# Patient Record
Sex: MALE | Race: AMERICAN INDIAN OR ALASKA NATIVE | HISPANIC OR LATINO | Employment: FULL TIME | URBAN - METROPOLITAN AREA
[De-identification: names, ages, dates, MRNs, and addresses within clinical notes are randomized per-mention and may not be internally consistent; named-entity substitution may affect disease eponyms.]

---

## 2021-12-13 ENCOUNTER — HOSPITAL ENCOUNTER (EMERGENCY)
Facility: HOSPITAL | Age: 61
Discharge: HOME | End: 2021-12-13
Admitting: PHYSICIAN ASSISTANT
Payer: OTHER MISCELLANEOUS

## 2021-12-13 VITALS
WEIGHT: 225 LBS | DIASTOLIC BLOOD PRESSURE: 84 MMHG | RESPIRATION RATE: 16 BRPM | SYSTOLIC BLOOD PRESSURE: 121 MMHG | OXYGEN SATURATION: 97 % | TEMPERATURE: 97.6 F | HEIGHT: 68 IN | HEART RATE: 98 BPM | BODY MASS INDEX: 34.1 KG/M2

## 2021-12-13 DIAGNOSIS — S46.912A LEFT SHOULDER STRAIN, INITIAL ENCOUNTER: ICD-10-CM

## 2021-12-13 PROCEDURE — 99282 EMERGENCY DEPT VISIT SF MDM: CPT

## 2021-12-13 ASSESSMENT — ENCOUNTER SYMPTOMS
VOMITING: 0
BACK PAIN: 0
NEUROLOGICAL NEGATIVE: 1
ABDOMINAL PAIN: 0
NAUSEA: 0
FATIGUE: 0
SHORTNESS OF BREATH: 0
COUGH: 0

## 2021-12-13 ASSESSMENT — MIFFLIN-ST. JEOR: SCORE: 1800.09

## 2021-12-13 NOTE — DISCHARGE INSTRUCTIONS
Please wear the sling to help immobilize your shoulder.  Throughout the day please take it out of the sling to work on simple range of motion exercises.  I recommend ice and ibuprofen to be used as needed.    I would like you to follow-up through orthopedics as an outpatient where they will likely assess you and consider and likely order an MRI of the shoulder.  I suspect that you may be suffering from a rotator cuff injury or strain.  I will provide you a work note for the next week if there is longer time that needs to be extended orthopedics can write for additional time.

## 2021-12-13 NOTE — ED PROVIDER NOTES
EMERGENCY DEPARTMENT ENCOUNTER      NAME: Scot Spaulding  AGE: 61 year old male  YOB: 1960  MRN: [id number]  EVALUATION DATE & TIME: No admission date for patient encounter.    PCP: No primary care provider on file.    ED PROVIDER: Abraham Arciniega PA-C      Chief Complaint   Patient presents with     Shoulder Pain         FINAL IMPRESSION:  1. Left shoulder strain, initial encounter          MEDICAL DECISION MAKING:    Pertinent Labs & Imaging studies reviewed. (See chart for details)  61 year old male presents to the Emergency Department for evaluation of left shoulder pain x3 to 4 days.    After obtaining history present illness, reviewing vitals and examined the patient I believe that this is all musculoskeletal, likely rotator cuff strain.  I informed the patient that x-ray imaging will likely not be helpful but I would refer the patient for outpatient follow-up through orthopedics where they will likely assess the patient and from there they can order outpatient MRIs of the shoulder or neck.  Again I am most suspicious that this is rotator cuff in origin.  I will provide a work note to excuse him for the next week.  We will provide sling and recommend conservative therapy in the form of anti-inflammatories and ice.  Overall patient comfortable with plan of care.        ED COURSE    I met with the patient, obtained history, performed an initial exam, and discussed options and plan for diagnostics and treatment here in the ED.    At the conclusion of the encounter I discussed the results of all of the tests and the disposition. The questions were answered. The patient or family acknowledged understanding and was agreeable with the care plan.     MEDICATIONS GIVEN IN THE EMERGENCY:  Medications - No data to display    NEW PRESCRIPTIONS STARTED AT TODAY'S ER VISIT  New Prescriptions    No medications on file            =================================================================    HPI    Patient  information was obtained from: Patient        Scot Spaulding is a 61 year old male for evaluation of left shoulder pain.  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  After this extensive physical work day he did have significant left shoulder pain.  He localizes to the left shoulder with radiation up towards the neck and then down into the triceps area.  Nothing that radiates all the way down to the fingertips.  He denies any weakness but does have significant pain with minimal movement of the shoulder.  No previous history of surgeries to the shoulder.  He denies any shortness of breath.  Patient reports that he did take a muscle relaxant on Saturday night and was able to sleep, things seemed a little bit better but then the following morning it was stiff and quite tender still.  He has been using ibuprofen as well, no reports of ice.  Patient denies any nausea, vomiting, or abdominal pain, and again no reports of chest pain or shortness of breath.      REVIEW OF SYSTEMS   Review of Systems   Constitutional: Negative for fatigue.   Respiratory: Negative for cough and shortness of breath.    Cardiovascular: Negative for chest pain.   Gastrointestinal: Negative for abdominal pain, nausea and vomiting.   Musculoskeletal: Negative for back pain.        Positive for left shoulder joint pain   Skin: Negative.    Neurological: Negative.    All other systems reviewed and are negative.         PAST MEDICAL HISTORY:  No past medical history on file.    PAST SURGICAL HISTORY:  No past surgical history on file.      CURRENT MEDICATIONS:    No current facility-administered medications for this encounter.  No current outpatient medications on file.      ALLERGIES:  No Known Allergies    FAMILY HISTORY:  No family history on file.    SOCIAL HISTORY:   Social History     Socioeconomic History     Marital  "status: Not on file     Spouse name: Not on file     Number of children: Not on file     Years of education: Not on file     Highest education level: Not on file   Occupational History     Not on file   Tobacco Use     Smoking status: Not on file     Smokeless tobacco: Not on file   Substance and Sexual Activity     Alcohol use: Not on file     Drug use: Not on file     Sexual activity: Not on file   Other Topics Concern     Not on file   Social History Narrative     Not on file     Social Determinants of Health     Financial Resource Strain: Not on file   Food Insecurity: Not on file   Transportation Needs: Not on file   Physical Activity: Not on file   Stress: Not on file   Social Connections: Not on file   Intimate Partner Violence: Not on file   Housing Stability: Not on file       VITALS:  Patient Vitals for the past 24 hrs:   BP Temp Temp src Pulse Resp SpO2 Height Weight   12/13/21 0955 121/84 97.6  F (36.4  C) Oral 98 16 97 % 1.727 m (5' 8\") 102.1 kg (225 lb)       PHYSICAL EXAM    Physical Exam  Vitals and nursing note reviewed.   Constitutional:       General: He is not in acute distress.     Appearance: He is normal weight. He is not ill-appearing or toxic-appearing.   HENT:      Head: Normocephalic.      Right Ear: External ear normal.      Left Ear: External ear normal.      Nose: Nose normal.   Pulmonary:      Effort: Pulmonary effort is normal. No respiratory distress.   Musculoskeletal:      Comments: Patient has normal distal radial and ulnar pulses palpated in the left upper extremity.  Motor and sensory function in the left fingertips are intact, normal capillary refill.  Internal and external rotation of the left shoulder does cause severe pain even against minimal resistance.  Patient is able to ABduct to approximately 60 degrees, however this causes significant discomfort.  Patient cannot reach his hand up over his head.  There is no palpable deformity.  There is some mild reproducible " tenderness to touch of the left trapezius, axial loading of the cervical spine does not seem to recreate any of the symptoms.  And there is no reports of numbness and tingling affecting any fingertips.  Remainder of musculoskeletal examination is benign.   Skin:     General: Skin is warm and dry.   Neurological:      General: No focal deficit present.      Mental Status: He is alert. Mental status is at baseline.      Motor: No weakness.   Psychiatric:         Mood and Affect: Mood normal.         Behavior: Behavior normal.          LAB:  All pertinent labs reviewed and interpreted.       RADIOLOGY:  Reviewed all pertinent imaging. Please see official radiology report.  No orders to display         Abraham Arciniega PA-C  Emergency Medicine  [facility]     Abraham Arciniega PA-C  12/13/21 1036

## 2021-12-13 NOTE — Clinical Note
Scot Spaulding was seen and treated in our emergency department on 12/13/2021.  He may return to work on 12/15/2021.  Must be on light duty, wearing his sling involving his left shoulder over the next week.  No lifting anything over 10 pounds, no pushing or pulling anything over 10 pounds.  Further recommendations can come from outpatient follow-up through orthopedics     If you have any questions or concerns, please don't hesitate to call.      Abraham Arciniega PA-C

## 2021-12-13 NOTE — ED TRIAGE NOTES
Pt was shoveling snow at work on Saturday,  when he felt a pain in his left shoulder. This pain persists, unable to lift his arm without difficulty.

## 2021-12-13 NOTE — Clinical Note
Scot Spaulding was seen and treated in our emergency department on 12/13/2021.  He may return to work on 12/21/2021.       If you have any questions or concerns, please don't hesitate to call.      Abraham Arciniega PA-C

## 2021-12-15 ENCOUNTER — ANCILLARY PROCEDURE (OUTPATIENT)
Dept: GENERAL RADIOLOGY | Facility: CLINIC | Age: 61
End: 2021-12-15
Attending: PEDIATRICS
Payer: COMMERCIAL

## 2021-12-15 ENCOUNTER — OFFICE VISIT (OUTPATIENT)
Dept: ORTHOPEDICS | Facility: CLINIC | Age: 61
End: 2021-12-15
Payer: OTHER MISCELLANEOUS

## 2021-12-15 VITALS — HEART RATE: 88 BPM | WEIGHT: 225 LBS | HEIGHT: 69 IN | BODY MASS INDEX: 33.33 KG/M2 | RESPIRATION RATE: 16 BRPM

## 2021-12-15 DIAGNOSIS — M25.512 ACUTE PAIN OF LEFT SHOULDER: Primary | ICD-10-CM

## 2021-12-15 DIAGNOSIS — M25.512 ACUTE PAIN OF LEFT SHOULDER: ICD-10-CM

## 2021-12-15 DIAGNOSIS — S46.912A LEFT SHOULDER STRAIN, INITIAL ENCOUNTER: ICD-10-CM

## 2021-12-15 PROCEDURE — 73030 X-RAY EXAM OF SHOULDER: CPT | Mod: LT | Performed by: RADIOLOGY

## 2021-12-15 PROCEDURE — 99204 OFFICE O/P NEW MOD 45 MIN: CPT | Performed by: PEDIATRICS

## 2021-12-15 RX ORDER — ATORVASTATIN CALCIUM 20 MG/1
TABLET, FILM COATED ORAL
COMMUNITY
Start: 2021-08-24

## 2021-12-15 RX ORDER — GABAPENTIN 100 MG/1
CAPSULE ORAL
COMMUNITY
Start: 2021-08-25

## 2021-12-15 RX ORDER — FLASH GLUCOSE SENSOR
KIT MISCELLANEOUS
COMMUNITY
Start: 2021-10-27

## 2021-12-15 RX ORDER — INSULIN GLARGINE 100 [IU]/ML
INJECTION, SOLUTION SUBCUTANEOUS
COMMUNITY
Start: 2021-10-07

## 2021-12-15 ASSESSMENT — MIFFLIN-ST. JEOR: SCORE: 1815.97

## 2021-12-15 NOTE — PROGRESS NOTES
ASSESSMENT & PLAN    Scot was seen today for pain.    Diagnoses and all orders for this visit:    Acute pain of left shoulder  -     XR Shoulder Left G/E 3 Views; Future    Left shoulder strain, initial encounter  -     Orthopedic  Referral      Acute new problem, uncertain prognosis.  Concern for cuff source, tear is consideration.  We discussed the following: symptom treatment, activity modification/rest, imaging, rehab and support for the affected area. Following discussion, plan:  He prefers monitoring, support.   Continue with sling.  Workability letter, light duty.  Symptom management and activity modification reviewed.  We discussed consideration of additional imaging of the affected area with MRI, but will defer for now.  Recheck 7-10 days, sooner prn. Pending course considerations may be PT, imaging.  Questions answered. Discussed signs and symptoms that may indicate more serious issues; the patient was instructed to seek appropriate care if noted. Scot indicates understanding of these issues and agrees with the plan.        See Patient Instructions  Patient Instructions   Continue with sling for comfort.  We discussed icing, over-the-counter medication as needed.  May use acetaminophen (Tylenol), as well as ibuprofen (examples are Motrin, Advil) as needed.  Also okay to use topical treatment such as IcyHot.  Limit activities for pain.  Provided a letter regarding activities for work.  Anticipate recheck in approximately 7 to 10 days.  At that time, we will reassess the function at the shoulder, and discussed additional considerations; this may include getting an MRI.    If you have any further questions for your physician or physician s care team you can call [phone] and use option 3 to leave a voice message. Calls received during business hours will be returned same day.        Jamel Cantu [facility]      CC: bAraham BEATTY  "Suze      -----  Chief Complaint   Patient presents with     Left Shoulder - Pain       SUBJECTIVE  Scot Spaulding is a/an 61 year old male who is seen in consultation at the request of  Abraham Arciniega PA-C for evaluation of left shoulder. Pain began after shoveling on 12/11/21.  Pain stretches across the top of his shoulder and into his arm.  Denies any numbness or tingling.  No pop, but the pain was sudden.     Was seen in the ER.    This is a w/c injury     The patient is seen by themselves.  The patient is Right handed    Onset: 3 day(s) ago. Patient describes injury as shoveling   Location of Pain: left shoulder   Worsened by: use, movement of his arm/shoulder   Better with: nothing   Treatments tried: ibuprofen and topical analgesics   Associated symptoms: no distal numbness or tingling; denies swelling or warmth    Orthopedic/Surgical history: NO  Social History/Occupation: management of storage facility     No family history pertinent to patient's problem today.   **  Was acute onset with one lift of the shovel noted pain diffusely in left lateral neck, through shoulder, into upper arm.    ED note reviewed.      REVIEW OF SYSTEMS:  Review of Systems   All other systems reviewed and are negative.        OBJECTIVE:  Pulse 88   Resp 16   Ht 1.753 m (5' 9\")   Wt 102.1 kg (225 lb)   BMI 33.23 kg/m     General: healthy, alert and in no distress  HEENT: no scleral icterus or conjunctival erythema  Skin: no suspicious lesions or rash. No jaundice.  CV: distal perfusion intact   Resp: normal respiratory effort without conversational dyspnea   Psych: normal mood and affect  Gait: normal steady gait with appropriate coordination and balance   Neuro: Normal light sensory exam of  extremity       Cervical Spine Exam    Range of Motion:       forward flexion min change in pain         extension min change in pain        lateral rotation to left no change; to right with pain on left        lateral flexion to left " no change; to right with pain on left    Sensation:     grossly intact througout bilateral upper extremities    Special Tests:     Neck area pain with Spurling, no radicular symptoms    Skin:     well perfused       capillary refill brisk    Lymphatics:      no edema noted in the upper extremities             Left Shoulder exam    ROM:      forward flexion ~80        abduction ~30       internal rotation unable to do belly press position       external rotation 5-10 deg  Pain limits AROM above    Tender:      acromioclavicular joint       subacromial space       posterior shoulder       Upper trap    Strength:      abduction 3/5       internal rotation 4/5       external rotation 3/5    Impingement testing:       Millan        empty can  Attempts with pain, limited    Skin:      no visible deformities       well perfused       capillary refill brisk    Sensation:      normal sensation over shoulder and upper extremity         RADIOLOGY:  I independently ordered, visualized and reviewed these images with the patient  No acute bony abnormality. AC joint arthrosis present.    Recent Results (from the past 24 hour(s))   XR Shoulder Left G/E 3 Views    Narrative    SHOULDER LEFT THREE OR MORE VIEWS   12/15/2021 11:19 AM     HISTORY: Acute pain of left shoulder.    COMPARISON: None.      Impression    IMPRESSION: Moderate glenohumeral degenerative changes. Glenohumeral  joint is unremarkable. No fracture or dislocation.

## 2021-12-15 NOTE — LETTER
12/15/2021         RE: Scot Spaulding  [address]  [place] [address]        Dear Colleague,    Thank you for referring your patient, Scot Spaulding, to the [facility]. Please see a copy of my visit note below.    ASSESSMENT & PLAN    Scot was seen today for pain.    Diagnoses and all orders for this visit:    Acute pain of left shoulder  -     XR Shoulder Left G/E 3 Views; Future    Left shoulder strain, initial encounter  -     Orthopedic  Referral      Acute new problem, uncertain prognosis.  Concern for cuff source, tear is consideration.  We discussed the following: symptom treatment, activity modification/rest, imaging, rehab and support for the affected area. Following discussion, plan:  He prefers monitoring, support.   Continue with sling.  Workability letter, light duty.  Symptom management and activity modification reviewed.  We discussed consideration of additional imaging of the affected area with MRI, but will defer for now.  Recheck 7-10 days, sooner prn. Pending course considerations may be PT, imaging.  Questions answered. Discussed signs and symptoms that may indicate more serious issues; the patient was instructed to seek appropriate care if noted. Scot indicates understanding of these issues and agrees with the plan.        See Patient Instructions  Patient Instructions   Continue with sling for comfort.  We discussed icing, over-the-counter medication as needed.  May use acetaminophen (Tylenol), as well as ibuprofen (examples are Motrin, Advil) as needed.  Also okay to use topical treatment such as IcyHot.  Limit activities for pain.  Provided a letter regarding activities for work.  Anticipate recheck in approximately 7 to 10 days.  At that time, we will reassess the function at the shoulder, and discussed additional considerations; this may include getting an MRI.    If you have any further questions for your physician or physician s  "care team you can call [phone] and use option 3 to leave a voice message. Calls received during business hours will be returned same day.        Jamel Tobar [facility]      CC: Abraham Arciniega      -----  Chief Complaint   Patient presents with     Left Shoulder - Pain       SUBJECTIVE  Scot Spaulding is a/an 61 year old male who is seen in consultation at the request of  Abraham Arciniega PA-C for evaluation of left shoulder. Pain began after shoveling on 12/11/21.  Pain stretches across the top of his shoulder and into his arm.  Denies any numbness or tingling.  No pop, but the pain was sudden.     Was seen in the ER.    This is a w/c injury     The patient is seen by themselves.  The patient is Right handed    Onset: 3 day(s) ago. Patient describes injury as shoveling   Location of Pain: left shoulder   Worsened by: use, movement of his arm/shoulder   Better with: nothing   Treatments tried: ibuprofen and topical analgesics   Associated symptoms: no distal numbness or tingling; denies swelling or warmth    Orthopedic/Surgical history: NO  Social History/Occupation: management of storage facility     No family history pertinent to patient's problem today.   **  Was acute onset with one lift of the shovel noted pain diffusely in left lateral neck, through shoulder, into upper arm.    ED note reviewed.      REVIEW OF SYSTEMS:  Review of Systems   All other systems reviewed and are negative.        OBJECTIVE:  Pulse 88   Resp 16   Ht 1.753 m (5' 9\")   Wt 102.1 kg (225 lb)   BMI 33.23 kg/m     General: healthy, alert and in no distress  HEENT: no scleral icterus or conjunctival erythema  Skin: no suspicious lesions or rash. No jaundice.  CV: distal perfusion intact   Resp: normal respiratory effort without conversational dyspnea   Psych: normal mood and affect  Gait: normal steady gait with appropriate coordination and balance   Neuro: Normal light sensory " exam of  extremity       Cervical Spine Exam    Range of Motion:       forward flexion min change in pain         extension min change in pain        lateral rotation to left no change; to right with pain on left        lateral flexion to left no change; to right with pain on left    Sensation:     grossly intact througout bilateral upper extremities    Special Tests:     Neck area pain with Spurling, no radicular symptoms    Skin:     well perfused       capillary refill brisk    Lymphatics:      no edema noted in the upper extremities             Left Shoulder exam    ROM:      forward flexion ~80        abduction ~30       internal rotation unable to do belly press position       external rotation 5-10 deg  Pain limits AROM above    Tender:      acromioclavicular joint       subacromial space       posterior shoulder       Upper trap    Strength:      abduction 3/5       internal rotation 4/5       external rotation 3/5    Impingement testing:       Millan        empty can  Attempts with pain, limited    Skin:      no visible deformities       well perfused       capillary refill brisk    Sensation:      normal sensation over shoulder and upper extremity         RADIOLOGY:  I independently ordered, visualized and reviewed these images with the patient  No acute bony abnormality. AC joint arthrosis present.    Recent Results (from the past 24 hour(s))   XR Shoulder Left G/E 3 Views    Narrative    SHOULDER LEFT THREE OR MORE VIEWS   12/15/2021 11:19 AM     HISTORY: Acute pain of left shoulder.    COMPARISON: None.      Impression    IMPRESSION: Moderate glenohumeral degenerative changes. Glenohumeral  joint is unremarkable. No fracture or dislocation.               Again, thank you for allowing me to participate in the care of your patient.        Sincerely,        Jamel Cantu DO

## 2021-12-15 NOTE — PATIENT INSTRUCTIONS
Continue with sling for comfort.  We discussed icing, over-the-counter medication as needed.  May use acetaminophen (Tylenol), as well as ibuprofen (examples are Motrin, Advil) as needed.  Also okay to use topical treatment such as IcyHot.  Limit activities for pain.  Provided a letter regarding activities for work.  Anticipate recheck in approximately 7 to 10 days.  At that time, we will reassess the function at the shoulder, and discussed additional considerations; this may include getting an MRI.    If you have any further questions for your physician or physician s care team you can call [phone] and use option 3 to leave a voice message. Calls received during business hours will be returned same day.

## 2021-12-15 NOTE — LETTER
REPORT OF WORK ABILITY    NOTE TO EMPLOYEE: You must promptly provide a copy of this report to your  employer or worker's compensation insurer, and Qualified Rehabilitation Consultant.    Date: 12/15/2021                     Employee Name: Scot Spaulding         YOB: 1960  Medical Record Number: [id number]   Soc.Sec.No: [id number]  Employer: None                Date of Injury: 12/11/21  Managed Care Organization / Insurance Company Name: UNKNOWN    Diagnosis: left shoulder injury, strain  Work Related: yes     MMI: NO  Permanent Partial Disability (PPD) likely: UNKNOWN    EMPLOYEE IS ABLE TO WORK: Return to work with restrictions on next scheduled work date. Restrictions in place through recheck appointment, and will be updated at that time. Anticipate recheck in approximately 7-10 days.     RESTRICTIONS IF ANY:    Lift, carry:  Up to 5 lbs on left  Push/Pull:  Up to 5 lbs on left  Shoulder/Elbow:  left Avoid gripping/grasping, no outstretched arm, Avoid repetitive motion and No operating machine/vibrating tools  May use left hand/arm as a helper for the right side if comfortable.  Ladder/Stair climb:  Not at all (0 hours) for ladders  Reach Above Shoulders:  Not at all (0 hours) on left    OTHER RESTRICTIONS: None    TREATMENT PLAN/NOTES: change work position for comfort, best work height mid chest to mid thigh, may need to restrict work activities for comfort, continue medications as discussed, shoulder sling for comfort and cold pack for comfort and Rest as needed.          Jamel ROLDAN

## 2021-12-17 NOTE — PROGRESS NOTES
Sports Medicine Clinic Visit      Assessment:  1. Strain of left shoulder, subsequent encounter        Plan:  Discussed the assessment with the patient.  MRI next. Continue with current cares in the meantime.  Questions answered. Discussed signs and symptoms that may indicate more serious issues; the patient was instructed to seek appropriate care if noted. Scot indicates understanding of these issues and agrees with the plan.        See Patient Instructions  Patient Instructions   Continued concern for rotator cuff injury.  Plan MRI of the left shoulder next. Will plan to contact you with MRI results.  In the meantime, may continue with sling for comfort.  Icing, over-the-counter medication as needed for soreness.  We discussed that you could cautiously try ibuprofen instead of the acetaminophen (Tylenol) given the current stomach upset from medication, though note that anti-inflammatory medication such as ibuprofen can cause the same thing.  We discussed additional pain medication for this.  In particular, judicious use of opioid pain medication could be considered.  One-time prescription for oxycodone today, use sparingly.  Use over-the-counter medication first line.  Continue with current work restrictions.  Letter updated today.    Advanced imaging is done by appointment. Some insurance companies may require a prior authorization to be completed which can delay the time until you are able to schedule your appointment.   Please call [place]: [phone] to schedule your MRI.  Depending on your availability you can usually schedule within the next 1-2 days.  If you are active on [employer], you may have access to your test results before your provider is able to review the study and advise on next steps.        The clinic will call you with results. If you have not heard from the clinic within 2-3 days following your MRI, please contact us at the number listed below.    If you have any  further questions for your physician or physician s care team you can call [phone] and use option 3 to leave a voice message. Calls received during business hours will be returned same day.          DO PASQUALE Conway [facility] SPORTS MEDICINE CLINIC TIFF      ==========================================      PCP: Tj Juan    Scot Spaulding is a 61 year old male who is seen in f/u up for a left shoulder injury. Since last visit on 12/15/2021 patient has noticed superolateral shoulder pain and lateral shoulder pain increasing. He is wearing a sling during the day. He notes that every time he moves while trying to sleep, he wakes up due to pain. He was taking Tylenol but it upsets his stomach. Is currently  using IcyHot 2-3x/day. He is currently on light duty at work.   **  Feels worse compared to last visit.  Has only been using acetaminophen since last visit.    **  PDMP reviewed. No concerns; no current opioid medication or sedative.      Review of Systems  All other systems reviewed and are negative unless noted above.    Past Medical History:   Diagnosis Date     Diabetes (H)      No past surgical history on file.  No family history on file.  Social History     Socioeconomic History     Marital status: Single     Spouse name: Not on file     Number of children: Not on file     Years of education: Not on file     Highest education level: Not on file   Occupational History     Not on file   Tobacco Use     Smoking status: Never Smoker     Smokeless tobacco: Never Used   Substance and Sexual Activity     Alcohol use: Not on file     Drug use: Not on file     Sexual activity: Not on file   Other Topics Concern     Not on file   Social History Narrative     Not on file     Social Determinants of Health     Financial Resource Strain: Not on file   Food Insecurity: Not on file   Transportation Needs: Not on file   Physical Activity: Not on file   Stress: Not on file   Social Connections: Not on  "file   Intimate Partner Violence: Not on file   Housing Stability: Not on file         Objective  /80   Ht 1.727 m (5' 8\")   Wt 102.1 kg (225 lb)   BMI 34.21 kg/m      GENERAL APPEARANCE: healthy, alert and no distress   GAIT: NORMAL  SKIN: no suspicious lesions or rashes  NEURO: Normal strength and tone, mentation intact and speech normal  PSYCH:  mentation appears normal and affect normal/bright    HEENT: no scleral icterus  CV: distal perfusion intact  RESP: nonlabored breathing      Exam      Left shoulder:  Flexion 80-90 deg  Abduction 30-40 deg  ER: 10-15 deg  IR: able to do belly press, mildly limited, some pain present  AROM above, limited by pain           Radiology  Previous x-ray:    SHOULDER LEFT THREE OR MORE VIEWS   12/15/2021 11:19 AM      HISTORY: Acute pain of left shoulder.     COMPARISON: None.                                                                      IMPRESSION: Moderate glenohumeral degenerative changes. Glenohumeral  joint is unremarkable. No fracture or dislocation.     KYRIE ROGERS MD                    This note consists of symbols derived from keyboarding, dictation and/or voice recognition software. As a result, there may be errors in the script that have gone undetected. Please consider this when interpreting information found in this chart.      "

## 2021-12-21 ENCOUNTER — TELEPHONE (OUTPATIENT)
Dept: ORTHOPEDICS | Facility: CLINIC | Age: 61
End: 2021-12-21
Payer: COMMERCIAL

## 2021-12-21 NOTE — TELEPHONE ENCOUNTER
Scot is calling the clinic in regards to his shldr pain.  The last two days his shldr pain has increased quite a bit and wondering if he could get pain meds until his next appt.  Or perhaps if he should be seen earlier?  He can be reached at [phone].

## 2021-12-24 ENCOUNTER — OFFICE VISIT (OUTPATIENT)
Dept: ORTHOPEDICS | Facility: CLINIC | Age: 61
End: 2021-12-24
Payer: OTHER MISCELLANEOUS

## 2021-12-24 VITALS
WEIGHT: 225 LBS | BODY MASS INDEX: 34.1 KG/M2 | DIASTOLIC BLOOD PRESSURE: 80 MMHG | SYSTOLIC BLOOD PRESSURE: 110 MMHG | HEIGHT: 68 IN

## 2021-12-24 DIAGNOSIS — S46.912D STRAIN OF LEFT SHOULDER, SUBSEQUENT ENCOUNTER: Primary | ICD-10-CM

## 2021-12-24 PROCEDURE — 99213 OFFICE O/P EST LOW 20 MIN: CPT | Performed by: PEDIATRICS

## 2021-12-24 RX ORDER — OXYCODONE HYDROCHLORIDE 5 MG/1
5 TABLET ORAL EVERY 8 HOURS PRN
Qty: 10 TABLET | Refills: 0 | Status: SHIPPED | OUTPATIENT
Start: 2021-12-24

## 2021-12-24 ASSESSMENT — PAIN SCALES - GENERAL: PAINLEVEL: SEVERE PAIN (7)

## 2021-12-24 ASSESSMENT — MIFFLIN-ST. JEOR: SCORE: 1800.09

## 2021-12-24 NOTE — PATIENT INSTRUCTIONS
Continued concern for rotator cuff injury.  Plan MRI of the left shoulder next. Will plan to contact you with MRI results.  In the meantime, may continue with sling for comfort.  Icing, over-the-counter medication as needed for soreness.  We discussed that you could cautiously try ibuprofen instead of the acetaminophen (Tylenol) given the current stomach upset from medication, though note that anti-inflammatory medication such as ibuprofen can cause the same thing.  We discussed additional pain medication for this.  In particular, judicious use of opioid pain medication could be considered.  One-time prescription for oxycodone today, use sparingly.  Use over-the-counter medication first line.  Continue with current work restrictions.  Letter updated today.    Advanced imaging is done by appointment. Some insurance companies may require a prior authorization to be completed which can delay the time until you are able to schedule your appointment.   Please call [place], [facility] and [place]: [phone] to schedule your MRI.  Depending on your availability you can usually schedule within the next 1-2 days.  If you are active on [employer], you may have access to your test results before your provider is able to review the study and advise on next steps.        The clinic will call you with results. If you have not heard from the clinic within 2-3 days following your MRI, please contact us at the number listed below.    If you have any further questions for your physician or physician s care team you can call [phone] and use option 3 to leave a voice message. Calls received during business hours will be returned same day.

## 2021-12-24 NOTE — LETTER
12/24/2021         RE: Scot Spaulding  [address]  [place] [address]        Dear Colleague,    Thank you for referring your patient, Scot Spaulding, to the [facility]. Please see a copy of my visit note below.    Sports Medicine Clinic Visit      Assessment:  1. Strain of left shoulder, subsequent encounter        Plan:  Discussed the assessment with the patient.  MRI next. Continue with current cares in the meantime.  Questions answered. Discussed signs and symptoms that may indicate more serious issues; the patient was instructed to seek appropriate care if noted. Scot indicates understanding of these issues and agrees with the plan.        See Patient Instructions  Patient Instructions   Continued concern for rotator cuff injury.  Plan MRI of the left shoulder next. Will plan to contact you with MRI results.  In the meantime, may continue with sling for comfort.  Icing, over-the-counter medication as needed for soreness.  We discussed that you could cautiously try ibuprofen instead of the acetaminophen (Tylenol) given the current stomach upset from medication, though note that anti-inflammatory medication such as ibuprofen can cause the same thing.  We discussed additional pain medication for this.  In particular, judicious use of opioid pain medication could be considered.  One-time prescription for oxycodone today, use sparingly.  Use over-the-counter medication first line.  Continue with current work restrictions.  Letter updated today.    Advanced imaging is done by appointment. Some insurance companies may require a prior authorization to be completed which can delay the time until you are able to schedule your appointment.   Please call [facility] and [facility]: [phone] to schedule your MRI.  Depending on your availability you can usually schedule within the next 1-2 days.  If you are active on [employer], you may have access to your test  results before your provider is able to review the study and advise on next steps.        The clinic will call you with results. If you have not heard from the clinic within 2-3 days following your MRI, please contact us at the number listed below.    If you have any further questions for your physician or physician s care team you can call [phone] and use option 3 to leave a voice message. Calls received during business hours will be returned same day.          DO PASQUALE Conway [facility] SPORTS MEDICINE CLINIC TIFF      ==========================================      PCP: Tj Juanos is a 61 year old male who is seen in f/u up for a left shoulder injury. Since last visit on 12/15/2021 patient has noticed superolateral shoulder pain and lateral shoulder pain increasing. He is wearing a sling during the day. He notes that every time he moves while trying to sleep, he wakes up due to pain. He was taking Tylenol but it upsets his stomach. Is currently  using IcyHot 2-3x/day. He is currently on light duty at work.   **  Feels worse compared to last visit.  Has only been using acetaminophen since last visit.    **  PDMP reviewed. No concerns; no current opioid medication or sedative.      Review of Systems  All other systems reviewed and are negative unless noted above.    Past Medical History:   Diagnosis Date     Diabetes (H)      No past surgical history on file.  No family history on file.  Social History     Socioeconomic History     Marital status: Single     Spouse name: Not on file     Number of children: Not on file     Years of education: Not on file     Highest education level: Not on file   Occupational History     Not on file   Tobacco Use     Smoking status: Never Smoker     Smokeless tobacco: Never Used   Substance and Sexual Activity     Alcohol use: Not on file     Drug use: Not on file     Sexual activity: Not on file   Other Topics Concern     Not on file  "  Social History Narrative     Not on file     Social Determinants of Health     Financial Resource Strain: Not on file   Food Insecurity: Not on file   Transportation Needs: Not on file   Physical Activity: Not on file   Stress: Not on file   Social Connections: Not on file   Intimate Partner Violence: Not on file   Housing Stability: Not on file         Objective  /80   Ht 1.727 m (5' 8\")   Wt 102.1 kg (225 lb)   BMI 34.21 kg/m      GENERAL APPEARANCE: healthy, alert and no distress   GAIT: NORMAL  SKIN: no suspicious lesions or rashes  NEURO: Normal strength and tone, mentation intact and speech normal  PSYCH:  mentation appears normal and affect normal/bright    HEENT: no scleral icterus  CV: distal perfusion intact  RESP: nonlabored breathing      Exam      Left shoulder:  Flexion 80-90 deg  Abduction 30-40 deg  ER: 10-15 deg  IR: able to do belly press, mildly limited, some pain present  AROM above, limited by pain           Radiology  Previous x-ray:    SHOULDER LEFT THREE OR MORE VIEWS   12/15/2021 11:19 AM      HISTORY: Acute pain of left shoulder.     COMPARISON: None.                                                                      IMPRESSION: Moderate glenohumeral degenerative changes. Glenohumeral  joint is unremarkable. No fracture or dislocation.     KYRIE ROGERS MD                    This note consists of symbols derived from keyboarding, dictation and/or voice recognition software. As a result, there may be errors in the script that have gone undetected. Please consider this when interpreting information found in this chart.          Again, thank you for allowing me to participate in the care of your patient.        Sincerely,        Jamel Cantu, DO    "

## 2021-12-30 ENCOUNTER — ANCILLARY PROCEDURE (OUTPATIENT)
Dept: MRI IMAGING | Facility: CLINIC | Age: 61
End: 2021-12-30
Attending: PEDIATRICS
Payer: OTHER MISCELLANEOUS

## 2021-12-30 DIAGNOSIS — S46.912D STRAIN OF LEFT SHOULDER, SUBSEQUENT ENCOUNTER: ICD-10-CM

## 2021-12-30 PROCEDURE — 73221 MRI JOINT UPR EXTREM W/O DYE: CPT | Mod: LT | Performed by: RADIOLOGY

## 2022-01-03 ENCOUNTER — TELEPHONE (OUTPATIENT)
Dept: ORTHOPEDICS | Facility: CLINIC | Age: 62
End: 2022-01-03

## 2022-01-03 DIAGNOSIS — M25.512 ACUTE PAIN OF LEFT SHOULDER: ICD-10-CM

## 2022-01-03 DIAGNOSIS — S46.912D STRAIN OF LEFT SHOULDER, SUBSEQUENT ENCOUNTER: Primary | ICD-10-CM

## 2022-01-03 NOTE — TELEPHONE ENCOUNTER
Results for orders placed or performed in visit on 12/30/21   MR Shoulder Left w/o Contrast    Narrative    EXAM: MR left shoulder without  contrast 12/30/2021 7:53 AM    TECHNIQUE: Multiplanar, multisequence imaging of the left shoulder  were obtained without administration of intravenous or intra-articular  gadolinium contrast using routine protocol.    History: Shoulder pain, rotator cuff disorder suspected, xray done;  Strain of left shoulder, subsequent encounter    Additional Clinical information from EMR: Superolateral shoulder pain  and lateral shoulder pain increasing. New shoulder pain after heavy  snow shoveling approximately 12/13/2021.    Comparison: X-ray 12/15/2021    Findings:    ROTATOR CUFF and ASSOCIATED STRUCTURES  Rotator cuff: Superimposed on high-grade tendinopathy, focal, near  full-thickness, partial width tear of the supraspinatus  middle/posterior fibers with extension into the junctional fibers of  the infraspinatus tendon at the footprint. Additional articular sided  partial thickness, partial width tear of the posterior fibers of the  infraspinatus tendon with intrasubstance extension as evidenced by a  sentinel cyst within the infraspinatus muscle belly (series 8, image  19). No significant tendon retraction of the supraspinatus tendon  (series 6, image 24, series 8, image 15). Subcortical cystic and  edema-like changes. Superimposed on high-grade tendinopathy, there is  a near full-thickness focal tear of the far superior fibers of the  subscapularis tendon with associated medial subluxation of the biceps  tendon. The teres minor tendon is intact.    Bursa: Trace subacromial and subdeltoid bursal fluid.    Musculature: Mild fatty atrophy of the supraspinatus and infraspinatus    Acromioclavicular joint  There are moderate degenerative changes of the acromioclavicular  joint. Acromion is type 2 in sagittal morphology.  Coracoacromial  ligament is not thickened.    OSSEOUS  STRUCTURES  No fracture, marrow contusion or marrow infiltration.    LONG BICIPITAL TENDON  The long head of the biceps tendon is medially subluxed from the  bicipital groove. Intra-articular tendinosis and fraying at the  bicipital labral anchor.    GLENOHUMERAL JOINT  Joint fluid: Physiologic amount of joint fluid is  present.    Cartilage and subarticular bone:  No focal hyaline cartilage defects  are noted at the glenoid, cartilage thinning of the humeral head. No  Hill-Sachs, reverse Hill-Sachs, or bony Bankart lesions are seen.    Labrum: Attenuated appearance of the superior-posterior labrum just at  the bicipital labral anchor, likely degenerative tearing on this non  arthrographic exam.    ANCILLARY FINDINGS:  None      Impression    Impression:  1. Superimposed on high-grade tendinopathy, focal, near  full-thickness, partial width tear of the supraspinatus  middle/posterior fibers with extension into the junctional fibers of  the infraspinatus tendon at the footprint. Additional articular sided  partial thickness, partial width tear of the posterior fibers of the  infraspinatus tendon with intrasubstance extension as evidenced by a  sentinel cyst within the infraspinatus muscle. No significant tendon  retraction of the supraspinatus tendon. Mild associated muscle  atrophy.  2. Superimposed on high-grade tendinopathy, near full-thickness focal  tear of the far superior fibers of the subscapularis tendon with  associated medial subluxation of the biceps tendon. Preserved muscle  bulk.  2. Moderate degenerative changes of the acromioclavicular joint.  3. Intra-articular biceps tendinosis.  4. Moderate grade biceps tendinopathy in its intra-articular course  extending into the bicipital labral anchor, associated mild  degeneration of the posterior superior labrum at the anchor.    I have personally reviewed the examination and initial interpretation  and I agree with the findings.    JUTTA ELLERMANN, MD          SYSTEM ID:  [id number]

## 2022-01-04 NOTE — TELEPHONE ENCOUNTER
LVM for patient to discuss MRI results and recommendations on the phone. Requested patient return call    Warren Cuellar ATC, LAT

## 2022-01-04 NOTE — TELEPHONE ENCOUNTER
Please call patient as soon as possible with MRI results and follow up plan. He is in lots of pain.

## 2022-01-04 NOTE — TELEPHONE ENCOUNTER
"MRI shows partial thickness rotator cuff tendon tearing, on top of some tendon degenerative change (tendinopathy) in the supraspinatus and subscapularis tendons. There is also medial subluxation (slippage) of the biceps long head tendon, along with degenerative change in the biceps tendon.  Options: 1) physical therapy (not as a \"fix\" but as working on motion, strength, improving function, and in doing so help with pain); 2) steroid injection (for pain relief, also not a \"fix\"); 3) referral on to ortho surgeon.  Would certainly offer PT and would consider injection if he is interested. If he would prefer to discuss whether this could be fixed surgically, would offer referral to ortho surgeon.  I think trial of PT and doing injection is very reasonable to start if he agrees.  If PT, monitor 1 month.  I would be happy to have a visit with the patient (in person, by video, or by phone) to discuss further if that would be helpful.  Thanks.  Jamel Cantu, , CAANTHONY    "

## 2022-01-04 NOTE — TELEPHONE ENCOUNTER
Spoke with sandoval on the phone and discussed MRI results and recommendations. Patient will give PT a try and order for DEMETRIO was placed. Patient will try physical therapy for a month and report back after with Dr Cantu.    Warren Cuellar, MELVIN, LAT

## 2022-01-06 ENCOUNTER — TELEPHONE (OUTPATIENT)
Dept: ORTHOPEDICS | Facility: CLINIC | Age: 62
End: 2022-01-06
Payer: OTHER MISCELLANEOUS

## 2022-01-06 NOTE — TELEPHONE ENCOUNTER
Called patient to clarify needs.  He indicated that he wanted his MRI results faxed to his employer or send results to personal email.  Patient does not have a signed release form.  It is suggested that he complete his [employer] account so that he can have access to these items that he is in need.  I have also offered to have him  the paperwork needed at the .  He indicated he would like to try to activate his MyChart.  He had no further questions.    Olga Argueta ATC, CSCS

## 2022-01-06 NOTE — TELEPHONE ENCOUNTER
Patient called in requesting copy of MRI report for his employer. Would like it emailed if possible. Please call if any questions. [phone]    Lorna

## 2022-01-24 ENCOUNTER — THERAPY VISIT (OUTPATIENT)
Dept: PHYSICAL THERAPY | Facility: CLINIC | Age: 62
End: 2022-01-24
Attending: PEDIATRICS
Payer: OTHER MISCELLANEOUS

## 2022-01-24 DIAGNOSIS — S46.912D STRAIN OF LEFT SHOULDER, SUBSEQUENT ENCOUNTER: ICD-10-CM

## 2022-01-24 DIAGNOSIS — M25.512 ACUTE PAIN OF LEFT SHOULDER: ICD-10-CM

## 2022-01-24 PROCEDURE — 97110 THERAPEUTIC EXERCISES: CPT | Mod: GP | Performed by: PHYSICAL THERAPIST

## 2022-01-24 PROCEDURE — 97162 PT EVAL MOD COMPLEX 30 MIN: CPT | Mod: GP | Performed by: PHYSICAL THERAPIST

## 2022-01-24 NOTE — PROGRESS NOTES
Physical Therapy Initial Evaluation  Subjective:  The history is provided by medical records and the patient. No  was used.   Patient Health History  Scot Spaulding being seen for Left shoulder pain.     Problem began: 12/11/2021.   Problem occurred: Shoveling snow at work   Pain is reported as 8/10 on pain scale.  General health as reported by patient is fair.  Pertinent medical history includes: diabetes.   Red flags:  None as reported by patient.             Current occupation .   Primary job tasks include:  Computer work and lifting/carrying.                  Therapist Generated HPI Evaluation  Problem details: While shoveling snow at work on 12/11/2021 had onset of shoulder pain in left shoulder, and has lost ability to use his left arm at all without pain.  He is having trouble laying down without pain also is not getting a restful night of sleep.  He was seen in the ED and eventually in orthopedic/sport medicine department.  An MRI revealed partial thickness rotator cuff tearing as well as biceps tendon subluxation.  Physical Therapy was ordered but he was not able to get started due to testing positive for COVID.  In the meantime, he had been sparingly using his limited prescription for Oxycodone but this has now run out and he has been unable to mange his pain with over the counter meds.  .         Type of problem:  Left shoulder.      Condition occurred with:  Other.  Where condition occurred: at work.  Patient reports pain:  In the joint, upper arm, anterior and posterior.  Pain is described as aching and sharp and is constant.  Pain radiates to:  Cervical. Pain is the same all the time.  Since onset symptoms are unchanged.  Associated symptoms:  Loss of motion/stiffness and loss of strength. Symptoms are exacerbated by certain positions, other and lying on extremity (using arm at all, even below shoulder level)  and relieved by rest (Ibuprofen not helping).  Special tests  included:  MRI.    Restrictions due to condition include:  Working in normal job with restrictions.                          Objective:  System                   Shoulder Evaluation:  ROM:  AROM:    Flexion:  Left:  40 degrees    Right:  160        Internal Rotation:  Left:  L5    Right:  T8                  PROM:    Flexion:  Left:  80              Internal Rotation:  Left:  30      External Rotation:  Left:  30                    Pain: Extremely pain and guarded with PROM    Strength:  not assessed                          Palpation:    Left shoulder tenderness present at:  Biceps; Infraspinatus; Deltoid; Upper Trap and Bicipital Groove                                       General     ROS    Assessment/Plan:    Patient is a 61 year old male with left side shoulder complaints.    Patient has the following significant findings with corresponding treatment plan.                Diagnosis 1:  Rotator cuff tear  Pain -  self management  Decreased ROM/flexibility - manual therapy and therapeutic exercise  Decreased strength - therapeutic exercise and therapeutic activities    Therapy Evaluation Codes:   1) History comprised of:   Personal factors that impact the plan of care:      Work status.    Comorbidity factors that impact the plan of care are:      Diabetes.     Medications impacting care: None.  2) Examination of Body Systems comprised of:   Body structures and functions that impact the plan of care:      Shoulder.   Activity limitations that impact the plan of care are:      Bathing, Driving, Dressing, Lifting, Working and Laying down.  3) Clinical presentation characteristics are:   Evolving/Changing.  4) Decision-Making    Moderate complexity using standardized patient assessment instrument and/or measureable assessment of functional outcome.  Cumulative Therapy Evaluation is: Moderate complexity.    Previous and current functional limitations:  (See Goal Flow Sheet for this information)    Short term and Long  term goals: (See Goal Flow Sheet for this information)     Communication ability:  Patient appears to be able to clearly communicate and understand verbal and written communication and follow directions correctly.  Treatment Explanation - The following has been discussed with the patient:   RX ordered/plan of care  Anticipated outcomes  Possible risks and side effects  This patient would benefit from PT intervention to resume normal activities.   Rehab potential is good.    Frequency:  1 X week, once daily  Duration:  for 6 weeks  Discharge Plan:  Achieve all LTG.  Independent in home treatment program.  Reach maximal therapeutic benefit.    Please refer to the daily flowsheet for treatment today, total treatment time and time spent performing 1:1 timed codes.

## 2022-01-28 ENCOUNTER — TELEPHONE (OUTPATIENT)
Dept: ORTHOPEDICS | Facility: CLINIC | Age: 62
End: 2022-01-28

## 2022-01-28 NOTE — TELEPHONE ENCOUNTER
Deborah with the patients workers comp called and left a voicemail in regards to patients written request authorization for MRI. The forms can be faxed to  [phone]

## 2022-01-28 NOTE — TELEPHONE ENCOUNTER
Called and spoke with Deborah.  She had not heard anything about the MRI and was wondering if we were still waiting for approval.  Let her know that MRI has been completed and patient was referred to PT, which he started this week.   She asked for MRI results to be faxed to [phone].    She asked if she would get info on his PT orders and appoitnments.  Directed her to [employer] scheduling to see if they have all information needed to bill work comp.     Lexis Santiago MS ATC

## 2022-01-31 ENCOUNTER — THERAPY VISIT (OUTPATIENT)
Dept: PHYSICAL THERAPY | Facility: CLINIC | Age: 62
End: 2022-01-31
Payer: OTHER MISCELLANEOUS

## 2022-01-31 DIAGNOSIS — S46.912D STRAIN OF LEFT SHOULDER, SUBSEQUENT ENCOUNTER: Primary | ICD-10-CM

## 2022-01-31 PROCEDURE — 97110 THERAPEUTIC EXERCISES: CPT | Mod: GP | Performed by: PHYSICAL THERAPIST

## 2022-01-31 PROCEDURE — 97112 NEUROMUSCULAR REEDUCATION: CPT | Mod: GP | Performed by: PHYSICAL THERAPIST

## 2022-02-06 ENCOUNTER — HEALTH MAINTENANCE LETTER (OUTPATIENT)
Age: 62
End: 2022-02-06

## 2022-02-07 ENCOUNTER — THERAPY VISIT (OUTPATIENT)
Dept: PHYSICAL THERAPY | Facility: CLINIC | Age: 62
End: 2022-02-07
Payer: OTHER MISCELLANEOUS

## 2022-02-07 DIAGNOSIS — M25.512 ACUTE PAIN OF LEFT SHOULDER: ICD-10-CM

## 2022-02-07 DIAGNOSIS — S46.912D STRAIN OF LEFT SHOULDER, SUBSEQUENT ENCOUNTER: Primary | ICD-10-CM

## 2022-02-07 PROCEDURE — 97110 THERAPEUTIC EXERCISES: CPT | Mod: GP | Performed by: PHYSICAL THERAPIST

## 2022-02-14 ENCOUNTER — THERAPY VISIT (OUTPATIENT)
Dept: PHYSICAL THERAPY | Facility: CLINIC | Age: 62
End: 2022-02-14
Payer: OTHER MISCELLANEOUS

## 2022-02-14 DIAGNOSIS — M25.512 ACUTE PAIN OF LEFT SHOULDER: Primary | ICD-10-CM

## 2022-02-14 DIAGNOSIS — S46.912D STRAIN OF LEFT SHOULDER, SUBSEQUENT ENCOUNTER: ICD-10-CM

## 2022-02-14 PROCEDURE — 97112 NEUROMUSCULAR REEDUCATION: CPT | Mod: GP | Performed by: PHYSICAL THERAPIST

## 2022-02-14 PROCEDURE — 97110 THERAPEUTIC EXERCISES: CPT | Mod: GP | Performed by: PHYSICAL THERAPIST

## 2022-02-15 NOTE — PROGRESS NOTES
Subjective:  HPI  Physical Exam                    Objective:  System    Physical Exam    General     ROS    Assessment/Plan:    PROGRESS  REPORT    Progress reporting period is from 1/24/22 to 2/15/22.       SUBJECTIVE   Subjective: Patient notes that pain over shoulder is almost gone. However at night time pain returns, still having pain outside of arm with any movement. Cant seem to get good rest, is waking up every morning at 1 am and cant fall asleep due to arm pain. Isometrics have not been tolerable.      Current Pain level: 5/10.      Initial Pain level: 8/10.   Changes in function:  Yes (See Goal flowsheet attached for changes in current functional level)  Adverse reaction to treatment or activity: None    OBJECTIVE    Objective: AROM flexion 115, abduction 81, ER 28, IR T10. IR 4-/5 (pain). ER 4/5     ASSESSMENT/PLAN  Updated problem list and treatment plan: Diagnosis 1:  Rotator cuff tear  Pain -  hot/cold therapy, manual therapy, splint/taping/bracing/orthotics, self management and home program  Decreased ROM/flexibility - manual therapy and therapeutic exercise  Decreased joint mobility - manual therapy and therapeutic exercise  Decreased strength - therapeutic exercise and therapeutic activities  STG/LTGs have been met or progress has been made towards goals:  Yes (See Goal flow sheet completed today.)  Assessment of Progress: The patient's condition has potential to improve.  The patient has had set backs in their progress.  Self Management Plans:  Patient has been instructed in a home treatment program.  Patient  has been instructed in self management of symptoms.  I have re-evaluated this patient and find that the nature, scope, duration and intensity of the therapy is appropriate for the medical condition of the patient.  Scot continues to require the following intervention to meet STG and LTG's:  PT    Recommendations:  This patient would benefit from continued therapy. In addition, recommend f/u  with referring provider due to pain and ongoing symptoms.     Frequency:  1X week, once daily  Duration:  for 4 weeks         Please refer to the daily flowsheet for treatment today, total treatment time and time spent performing 1:1 timed codes.

## 2022-02-18 ENCOUNTER — APPOINTMENT (OUTPATIENT)
Dept: RADIOLOGY | Facility: HOSPITAL | Age: 62
End: 2022-02-18
Payer: OTHER MISCELLANEOUS

## 2022-02-18 ENCOUNTER — TELEPHONE (OUTPATIENT)
Dept: ORTHOPEDICS | Facility: CLINIC | Age: 62
End: 2022-02-18

## 2022-02-18 ENCOUNTER — HOSPITAL ENCOUNTER (EMERGENCY)
Facility: HOSPITAL | Age: 62
Discharge: HOME | End: 2022-02-18
Admitting: PHYSICIAN ASSISTANT
Payer: OTHER MISCELLANEOUS

## 2022-02-18 ENCOUNTER — APPOINTMENT (OUTPATIENT)
Dept: CT IMAGING | Facility: HOSPITAL | Age: 62
End: 2022-02-18
Payer: OTHER MISCELLANEOUS

## 2022-02-18 VITALS
DIASTOLIC BLOOD PRESSURE: 85 MMHG | RESPIRATION RATE: 24 BRPM | SYSTOLIC BLOOD PRESSURE: 139 MMHG | OXYGEN SATURATION: 98 % | WEIGHT: 225 LBS | HEART RATE: 76 BPM | BODY MASS INDEX: 34.21 KG/M2 | TEMPERATURE: 97.5 F

## 2022-02-18 DIAGNOSIS — S46.009A ROTATOR CUFF INJURY: ICD-10-CM

## 2022-02-18 DIAGNOSIS — M79.622 PAIN OF LEFT UPPER ARM: ICD-10-CM

## 2022-02-18 LAB
ANION GAP SERPL CALCULATED.3IONS-SCNC: 10 MMOL/L (ref 5–18)
BASOPHILS # BLD AUTO: 0.1 10E3/UL (ref 0–0.2)
BASOPHILS NFR BLD AUTO: 1 %
BUN SERPL-MCNC: 15 MG/DL (ref 8–22)
C REACTIVE PROTEIN LHE: 0.1 MG/DL (ref 0–0.8)
CALCIUM SERPL-MCNC: 9.8 MG/DL (ref 8.5–10.5)
CHLORIDE BLD-SCNC: 107 MMOL/L (ref 98–107)
CO2 SERPL-SCNC: 22 MMOL/L (ref 22–31)
CREAT SERPL-MCNC: 1.09 MG/DL (ref 0.7–1.3)
EOSINOPHIL # BLD AUTO: 0.2 10E3/UL (ref 0–0.7)
EOSINOPHIL NFR BLD AUTO: 2 %
ERYTHROCYTE [DISTWIDTH] IN BLOOD BY AUTOMATED COUNT: 12.9 % (ref 10–15)
ERYTHROCYTE [SEDIMENTATION RATE] IN BLOOD BY WESTERGREN METHOD: 7 MM/HR (ref 0–15)
GFR SERPL CREATININE-BSD FRML MDRD: 77 ML/MIN/1.73M2
GLUCOSE BLD-MCNC: 131 MG/DL (ref 70–125)
HCT VFR BLD AUTO: 47.1 % (ref 40–53)
HGB BLD-MCNC: 16.2 G/DL (ref 13.3–17.7)
IMM GRANULOCYTES # BLD: 0.1 10E3/UL
IMM GRANULOCYTES NFR BLD: 1 %
LYMPHOCYTES # BLD AUTO: 3.3 10E3/UL (ref 0.8–5.3)
LYMPHOCYTES NFR BLD AUTO: 34 %
MCH RBC QN AUTO: 31.4 PG (ref 26.5–33)
MCHC RBC AUTO-ENTMCNC: 34.4 G/DL (ref 31.5–36.5)
MCV RBC AUTO: 91 FL (ref 78–100)
MONOCYTES # BLD AUTO: 0.8 10E3/UL (ref 0–1.3)
MONOCYTES NFR BLD AUTO: 8 %
NEUTROPHILS # BLD AUTO: 5.3 10E3/UL (ref 1.6–8.3)
NEUTROPHILS NFR BLD AUTO: 54 %
NRBC # BLD AUTO: 0 10E3/UL
NRBC BLD AUTO-RTO: 0 /100
PLATELET # BLD AUTO: 219 10E3/UL (ref 150–450)
POTASSIUM BLD-SCNC: 4.3 MMOL/L (ref 3.5–5)
RBC # BLD AUTO: 5.16 10E6/UL (ref 4.4–5.9)
SODIUM SERPL-SCNC: 139 MMOL/L (ref 136–145)
TROPONIN I SERPL-MCNC: <0.01 NG/ML (ref 0–0.29)
WBC # BLD AUTO: 9.6 10E3/UL (ref 4–11)

## 2022-02-18 PROCEDURE — 74174 CTA ABD&PLVS W/CONTRAST: CPT

## 2022-02-18 PROCEDURE — 86140 C-REACTIVE PROTEIN: CPT | Performed by: PHYSICIAN ASSISTANT

## 2022-02-18 PROCEDURE — 71275 CT ANGIOGRAPHY CHEST: CPT

## 2022-02-18 PROCEDURE — 85025 COMPLETE CBC W/AUTO DIFF WBC: CPT | Performed by: PHYSICIAN ASSISTANT

## 2022-02-18 PROCEDURE — 99285 EMERGENCY DEPT VISIT HI MDM: CPT | Mod: 25

## 2022-02-18 PROCEDURE — 250N000011 HC RX IP 250 OP 636: Performed by: PHYSICIAN ASSISTANT

## 2022-02-18 PROCEDURE — 96374 THER/PROPH/DIAG INJ IV PUSH: CPT | Mod: 59

## 2022-02-18 PROCEDURE — 93005 ELECTROCARDIOGRAM TRACING: CPT | Performed by: PHYSICIAN ASSISTANT

## 2022-02-18 PROCEDURE — 250N000013 HC RX MED GY IP 250 OP 250 PS 637: Performed by: PHYSICIAN ASSISTANT

## 2022-02-18 PROCEDURE — 84484 ASSAY OF TROPONIN QUANT: CPT | Performed by: PHYSICIAN ASSISTANT

## 2022-02-18 PROCEDURE — 85652 RBC SED RATE AUTOMATED: CPT | Performed by: PHYSICIAN ASSISTANT

## 2022-02-18 PROCEDURE — 73030 X-RAY EXAM OF SHOULDER: CPT | Mod: LT

## 2022-02-18 PROCEDURE — 96372 THER/PROPH/DIAG INJ SC/IM: CPT | Performed by: PHYSICIAN ASSISTANT

## 2022-02-18 PROCEDURE — 80048 BASIC METABOLIC PNL TOTAL CA: CPT | Performed by: PHYSICIAN ASSISTANT

## 2022-02-18 PROCEDURE — 36415 COLL VENOUS BLD VENIPUNCTURE: CPT | Performed by: PHYSICIAN ASSISTANT

## 2022-02-18 RX ORDER — OXYCODONE HYDROCHLORIDE 5 MG/1
5 TABLET ORAL EVERY 6 HOURS PRN
Qty: 5 TABLET | Refills: 0 | Status: SHIPPED | OUTPATIENT
Start: 2022-02-18

## 2022-02-18 RX ORDER — LIDOCAINE 4 G/G
1 PATCH TOPICAL ONCE
Status: DISCONTINUED | OUTPATIENT
Start: 2022-02-18 | End: 2022-02-18 | Stop reason: HOSPADM

## 2022-02-18 RX ORDER — OXYCODONE HYDROCHLORIDE 5 MG/1
5 TABLET ORAL ONCE
Status: COMPLETED | OUTPATIENT
Start: 2022-02-18 | End: 2022-02-18

## 2022-02-18 RX ORDER — KETOROLAC TROMETHAMINE 30 MG/ML
30 INJECTION, SOLUTION INTRAMUSCULAR; INTRAVENOUS ONCE
Status: COMPLETED | OUTPATIENT
Start: 2022-02-18 | End: 2022-02-18

## 2022-02-18 RX ORDER — IOPAMIDOL 755 MG/ML
100 INJECTION, SOLUTION INTRAVASCULAR ONCE
Status: COMPLETED | OUTPATIENT
Start: 2022-02-18 | End: 2022-02-18

## 2022-02-18 RX ORDER — MORPHINE SULFATE 4 MG/ML
4 INJECTION, SOLUTION INTRAMUSCULAR; INTRAVENOUS ONCE
Status: COMPLETED | OUTPATIENT
Start: 2022-02-18 | End: 2022-02-18

## 2022-02-18 RX ORDER — OXYCODONE HYDROCHLORIDE 5 MG/1
5 TABLET ORAL EVERY 6 HOURS PRN
Qty: 5 TABLET | Refills: 0 | Status: SHIPPED | OUTPATIENT
Start: 2022-02-18 | End: 2022-02-18

## 2022-02-18 RX ORDER — ACETAMINOPHEN 325 MG/1
975 TABLET ORAL ONCE
Status: COMPLETED | OUTPATIENT
Start: 2022-02-18 | End: 2022-02-18

## 2022-02-18 RX ADMIN — LIDOCAINE 1 PATCH: 246 PATCH TOPICAL at 10:35

## 2022-02-18 RX ADMIN — ACETAMINOPHEN 975 MG: 325 TABLET ORAL at 10:32

## 2022-02-18 RX ADMIN — KETOROLAC TROMETHAMINE 30 MG: 30 INJECTION, SOLUTION INTRAMUSCULAR at 10:41

## 2022-02-18 RX ADMIN — MORPHINE SULFATE 4 MG: 4 INJECTION, SOLUTION INTRAMUSCULAR; INTRAVENOUS at 12:02

## 2022-02-18 RX ADMIN — IOPAMIDOL 100 ML: 755 INJECTION, SOLUTION INTRAVENOUS at 13:06

## 2022-02-18 RX ADMIN — OXYCODONE HYDROCHLORIDE 5 MG: 5 TABLET ORAL at 10:33

## 2022-02-18 ASSESSMENT — ENCOUNTER SYMPTOMS
DIARRHEA: 0
NAUSEA: 0
HEADACHES: 0
NUMBNESS: 0
VOMITING: 0
ABDOMINAL PAIN: 0
SLEEP DISTURBANCE: 1
NECK PAIN: 0

## 2022-02-18 NOTE — TELEPHONE ENCOUNTER
LVM for patient.     Chart review shows patient is currently in the ER.     Lexis Santiago MS ATC

## 2022-02-18 NOTE — DISCHARGE INSTRUCTIONS
You were seen in the emergency department for left sided arm pain. At this time, your blood work and imaging are very reassuring for no life threatening or emergent source of your symptoms. I suspect they are from  your known rotator cuff injury.    Take care of yourself at home.  -Gentle range of motion and stretching  -Ice or heat  -Lidocaine patches    For pain or fever you may take:  - Ibuprofen 600 mg every 6 hours. Max 3200 mg in 24 hours  - Please take this medication with food as it can cause an upset stomach  - Please do not take this medication if you have a history of a GI bleed or kidney problems  - Tylenol 650 mg every 6 hours. Max 4000 mg in 24 hours.   - Please do not take this medication with alcohol as it can cause problems with your liver.  - You can take both of these medications at the same time or alternate them every 3 hours. For example, tylenol at 6a, ibuprofen at 9a, tylenol at 12p, ibuprofen at 3p, etc.    For pain not well controlled with all the above interventions you can take oxycodone 5 mg every 6 hours.  Do not drive or go to work while you take this medication.  It can be addicting so only take it for severe pain.     Please follow up with Dr. Cantu to ensure your symptoms are improving.    Return to the emergency department if:  - You develop a fever (100.4F or above)  - Unable to feel or move your fingers  -  Arm becomes red, swollen, and hot  - cheat pain  - Or with any other concerning symptom

## 2022-02-18 NOTE — ED NOTES
Patient reports that he drove himself here today. Provider notified and okayed to proceed with administration of oxycodone. Patient informed that he will need someone to drive him home upon discharge.

## 2022-02-18 NOTE — ED NOTES
Patient unable to drive himself home due to medications administered. Unable to arrange a ride. Cab voucher given.

## 2022-02-18 NOTE — ED NOTES
Pt.sleeping O2sat drops to 84% RA. Awakens to voice sats increase to 98% RA,  reports no change in pain after Morphine dose. Provider updated.

## 2022-02-18 NOTE — ED PROVIDER NOTES
Emergency Department Encounter   NAME: Scot Spaulding ; AGE: 61 year old male ; YOB: 1960 ; MRN: [id number] ; EVALUATION DATE & TIME: 2/18/2022 10:06 AM ; PCP: Tj Juan   ED PROVIDER: Ava Underwood PA-C    Chief Complaint   Patient presents with     Shoulder Injury       Medical Decision Making & Final Diagnosis     1. Rotator cuff injury    2. Pain of left upper arm         ED Course as of 02/18/22 1756 Fri Feb 18, 2022   1026 Scot is a 60 yo M w PMH of HLD, GERD, and DMT2 who presents to the ED for eval of L arm pain. H/o chronic injury in dec 2021 after shoveling. Has been seeing orthopedics and PT without significant improvement in his discomfort per the patient. Pain previously did not shoot all the way into his fingers.    My exam is notable for mild tachycardia and otherwise nl vital signs in a nontoxic but uncomfortable appearing pt. Radial and PT pulse 2+ and equal bilaterally. Sensation intact to light touch in radial, ulnar, and median nerve distribution.  No midline TTP to c spine or paraspinal muscles.  Diffuse TTP proximal to elbow and musculature extending through left shoulder joint.  Adduction to 45 degrees extremely uncomfortable to patient.  Intact passive external and internal rotation and flexion. No skin changes to L arm. No edema.   1751 Patient's initial presentation did seem consistent with musculoskeletal pain.  He had a very challenging exam to perform due to his discomfort.  I plan to treat discomfort and reevaluate the patient.  He was given Tylenol, oxycodone, Toradol, and lidocaine patch and noted [place] no improvement of his symptoms.  He was still having sensitivity to light touch and significant pain even with passive range of motion of his shoulder so plan to expand work-up.  No risk factors for septic joint but certainly on differential with pain with passive range of motion.  No fever, leukocytosis, or abnormal inflammatory markers here.  No joint effusion  on x-ray.  Feel this is less likely.  No history of gout in joint is not swollen or warm to the touch.  Again no joint effusion.  Given this report of intermittent chest pressure radiation down his entire arm not fitting any particular dermatomal or muscular pattern, age, and possible risk factors for aortic dissection, and sudden onset that woke him up from sleep I did perform a CTA scan which is reassuring.  Nothing on exam to suggest DVT, herpes zoster, cellulitis.  Compartments are soft.  Upon chart review it appears that he reported to his physical therapist that this pain is woken him up from sleep multiple times.  Despite given Tylenol, oxycodone, Toradol, morphine, lidocaine patch and being seen comfortably sleeping in between evaluations he is noting 10 out of 10 pain still.  He is neurovascularly intact and without red flag signs and symptoms and work-up here.  He does have close follow-up with orthopedics on Tuesday.  I do think he is appropriate for outpatient management I will provide him with a small course of oxycodone to help with pain management at home until that time.        ED Course   10:23 AM I met and introduced myself to the patient. I gathered initial history and performed my physical exam. We discussed plan for initial workup.   11:27 AM Reexamined the patient.  1:28 PM I spoke with the nursing staff, and reviewed imaging. Patient is sleeping comfortably. Plan for discharge.  1:36 PM Updated the patient, discussed discharge plan, no further questions.  PPE: Provider wore gloves, N95 mask, eye protection, surgical cap, and paper mask.      MEDICATIONS GIVEN IN THE EMERGENCY:   Medications   oxyCODONE (ROXICODONE) tablet 5 mg (5 mg Oral Given 2/18/22 1033)   ketorolac (TORADOL) injection 30 mg (30 mg Intramuscular Given 2/18/22 1041)   acetaminophen (TYLENOL) tablet 975 mg (975 mg Oral Given 2/18/22 1032)   morphine (PF) injection 4 mg (4 mg Intravenous Given 2/18/22 1202)   iopamidol  (ISOVUE-370) solution 100 mL (100 mLs Intravenous Given 2/18/22 1306)      NEW PRESCRIPTIONS STARTED AT TODAY'S ER VISIT:  Discharge Medication List as of 2/18/2022  1:51 PM      START taking these medications    Details   !! oxyCODONE (ROXICODONE) 5 MG tablet Take 1 tablet (5 mg) by mouth every 6 hours as needed for pain, Disp-5 tablet, R-0, Local Print       !! - Potential duplicate medications found. Please discuss with provider.        =================================================================   History   Patient information was obtained from: Patient   Use of Intrepreter: N/A  Scot Spaulding is a 61 year old male with a relevant PMH of chronic bilateral low back pain without sciatica, hyperlipidemia, Bell's Palsy, and diabetes mellitus type 2 who presents to the ED for evaluation of a shoulder injury.     Per chart review (2/14/2022), the patient had Physical Therapy visit with [facility] PT, for left shoulder injury. Patient notes that pain is almost gone, but presents at night time, particularly with exacerbation. He has not been able to get good sleep, and reports routinely waking up at 0100 due to the pain. The initial pain level was 8/10, and currently it is 5/10. Treatment plan is to continue with hot/cold therapy, manual therapy, splint/taping/bracing/orthotics, self management, and home therapy. Patient's condition has continued to improve. PT has re-evaluated the patient, and the established therapy is appropriate for the medical condition of the patient. Patient will benefit from continued therapy, 1X week for 4 weeks.    This past 12/2021 (approximately 2 months ago), the patient was shoveling snow and injured his left shoulder. After X-rays, nothing was broken and he was referred to ortho outpatient. There was tearing and inflammation found, he has been visiting PT 1X/week since the end of 12/2021. He states that his PT referred him to visit his provider because therapy was not working. He  could not get in till 2/21, so he decided to present to the ED. This morning (2/18) at 0200, he woke up with increased pain to his left shoulder. Typically, the pain is localized, but today there was new radiation of the pain down his left arm to all of his fingers. The pain is exacerbated with movement, touch, and he states he is unable to move his elbow (new). The pain comes in waves, and is not constant. He took ibuprofen this morning at 0200, but denies relief, with no other pain medications. The pain is currently at 10/10. In addition, the patient reports chest pressure when he takes a deep breath. He has not been sleeping well. He only takes the ibuprofen in the evening. The patient denies headaches, vision changes, abdominal pain, neck pain, chest pain, nausea, vomiting, numbness/tingling/abnormal swelling to legs, new injuries, or any other complaints at this time.  ______________________________________________________________________  Past Medical History:   Diagnosis Date     Diabetes (H)         History reviewed. No pertinent surgical history.    No family history on file.    Social History     Tobacco Use     Smoking status: Never Smoker     Smokeless tobacco: Never Used   Substance Use Topics     Alcohol use: None     Drug use: None       REVIEW OF SYSTEMS:    Review of Systems   Eyes: Negative for visual disturbance.   Cardiovascular: Negative for chest pain and leg swelling.   Gastrointestinal: Negative for abdominal pain, diarrhea, nausea and vomiting.   Musculoskeletal: Negative for neck pain.        Positive for left shoulder pain   Neurological: Negative for numbness and headaches.   Psychiatric/Behavioral: Positive for sleep disturbance.   All other systems reviewed and are negative.        Physical Exam   /85   Pulse 76   Temp 97.5  F (36.4  C) (Oral)   Resp 24   Wt 102.1 kg (225 lb)   SpO2 98%   BMI 34.21 kg/m      Physical Exam  Vitals reviewed.   Constitutional:       General: He  is not in acute distress (uncomfortable appearing).     Appearance: Normal appearance.   HENT:      Head: Normocephalic.   Eyes:      Conjunctiva/sclera: Conjunctivae normal.   Cardiovascular:      Rate and Rhythm: Regular rhythm. Tachycardia present.      Pulses:           Radial pulses are 2+ on the right side and 2+ on the left side.        Posterior tibial pulses are 2+ on the right side and 2+ on the left side.      Heart sounds: Normal heart sounds.   Pulmonary:      Effort: Pulmonary effort is normal. No respiratory distress.      Breath sounds: Normal breath sounds. No wheezing, rhonchi or rales.   Abdominal:      General: There is no distension.      Palpations: Abdomen is soft.      Tenderness: There is no abdominal tenderness. There is no guarding or rebound.   Musculoskeletal:         General: No swelling or deformity. Normal range of motion.      Cervical back: Normal range of motion.      Right lower leg: No edema.      Left lower leg: No edema.      Comments: No midline TTP to c spine or paraspinal muscles.  Diffuse TTP proximal to elbow and musculature extending through left shoulder joint.  Adduction to 45 degrees extremely uncomfortable to patient.  Intact passive external and internal rotation and flexion.    Skin:     General: Skin is warm.      Comments: No skin changes to L arm. No edema.   Neurological:      General: No focal deficit present.      Mental Status: He is alert.      Sensory: Sensation is intact.      Comments: Sensation intact to light touch in radial, ulnar, and median nerve distribution.   Psychiatric:         Mood and Affect: Mood normal.         Lab Work (Reviewed and Interpreted):   Labs Ordered and Resulted from Time of ED Arrival to Time of ED Departure   BASIC METABOLIC PANEL - Abnormal       Result Value    Sodium 139      Potassium 4.3      Chloride 107      Carbon Dioxide (CO2) 22      Anion Gap 10      Urea Nitrogen 15      Creatinine 1.09      Calcium 9.8       Glucose 131 (*)     GFR Estimate 77     TROPONIN I - Normal    Troponin I <0.01     CRP INFLAMMATION - Normal    CRP 0.1     ERYTHROCYTE SEDIMENTATION RATE AUTO - Normal    Erythrocyte Sedimentation Rate 7     CBC WITH PLATELETS AND DIFFERENTIAL    WBC Count 9.6      RBC Count 5.16      Hemoglobin 16.2      Hematocrit 47.1      MCV 91      MCH 31.4      MCHC 34.4      RDW 12.9      Platelet Count 219      % Neutrophils 54      % Lymphocytes 34      % Monocytes 8      % Eosinophils 2      % Basophils 1      % Immature Granulocytes 1      NRBCs per 100 WBC 0      Absolute Neutrophils 5.3      Absolute Lymphocytes 3.3      Absolute Monocytes 0.8      Absolute Eosinophils 0.2      Absolute Basophils 0.1      Absolute Immature Granulocytes 0.1      Absolute NRBCs 0.0         Imaging (Reviewed and Interpreted):   XR Shoulder Left G/E 3 Views   Final Result   IMPRESSION: Left shoulder negative for acute process or significant change from prior radiographs. Normal joint alignment. Mild degenerative change at the AC joint, within expected limits for age. No fracture or bone lesion.       CTA Chest Abdomen Pelvis w Contrast   Final Result   IMPRESSION:      1.  Nonaneurysmal aorta without dissection.      2.  No pulmonary embolism.      3.  Minimal patchy bilateral groundglass opacities compatible with infectious or inflammatory process (correlate for COVID).                   EKG (Reviewed and Interpreted):   Performed at: 18-FEB-2022 12:00:01  Impression: Normal sinus rhythm  Normal ECG    Rate: 82 BPM  VA Interval: 162 ms  QRS Duration: 86 ms  Qtc Interval: 418 ms    No previous available for comparison.    EKG results reviewed and interpreted by Dr. Ally Lanza ED MD.     PROCEDURES:   n/a     Janice WADSWORTH, am serving as a scribe to document services personally performed by Ava Underwood PA-C, based on my observation and the provider's statements to me. Ava WADSWORTH PA-C attest that Janice Ray  acting in a scribe capacity, has observed my performance of the services and has documented them in accordance with my direction.     Ava Underwood PA-C   Emergency Medicine   [facility] EMERGENCY DEPARTMENT  [address] [place] [id number] [phone]  Dept: [phone]        Ava Underwood PA-C  02/18/22 5837     37

## 2022-02-18 NOTE — ED TRIAGE NOTES
Patient presents here with persistent pain to his left shoulder that has persisted over the past two months following shoveling snow. He has followed up with orthopedics and physical therapy, but states that his pain and ability to use the shoulder and arm is not improved. He notes that a MRI that was done at [facility] that revealed an abnormality

## 2022-02-18 NOTE — TELEPHONE ENCOUNTER
Patient is having extreme pain in his arm. Wants to see if he can get in sooner. Already has an appt for 2/22. Also wants to discuss pain meds. Would like a call back asap.     [phone]    Lorna

## 2022-02-18 NOTE — ED NOTES
"Pt repots \"chest pressure\" no changes noted on cardiac monitor. Provider updated. He reports L shoulder pain 7/10 at rest after morphine dose. He does not want to move L arm.    "

## 2022-02-18 NOTE — Clinical Note
Scot Spaulding was seen and treated in our emergency department on 2/18/2022.  He may return to work on 02/19/2022.       If you have any questions or concerns, please don't hesitate to call.      Ava Underwood PA-C

## 2022-02-18 NOTE — ED NOTES
Patient arrives with injury to Left shoulder that occurred in December while shoveling snow. Has been seen by ortho and going to Physical Therapy since. Reports pain worsening at night. Awakened at 2AM today with severe pain radiating to fingertips. Took 400mg ibuprofen at 2AM.

## 2022-02-22 ENCOUNTER — OFFICE VISIT (OUTPATIENT)
Dept: ORTHOPEDICS | Facility: CLINIC | Age: 62
End: 2022-02-22
Payer: OTHER MISCELLANEOUS

## 2022-02-22 VITALS
DIASTOLIC BLOOD PRESSURE: 80 MMHG | HEIGHT: 68 IN | SYSTOLIC BLOOD PRESSURE: 136 MMHG | BODY MASS INDEX: 34.1 KG/M2 | WEIGHT: 225 LBS

## 2022-02-22 DIAGNOSIS — S46.012D TRAUMATIC INCOMPLETE TEAR OF LEFT ROTATOR CUFF, SUBSEQUENT ENCOUNTER: ICD-10-CM

## 2022-02-22 DIAGNOSIS — S46.912D STRAIN OF LEFT SHOULDER, SUBSEQUENT ENCOUNTER: Primary | ICD-10-CM

## 2022-02-22 LAB
ATRIAL RATE - MUSE: 82 BPM
DIASTOLIC BLOOD PRESSURE - MUSE: NORMAL MMHG
INTERPRETATION ECG - MUSE: NORMAL
P AXIS - MUSE: 8 DEGREES
PR INTERVAL - MUSE: 162 MS
QRS DURATION - MUSE: 86 MS
QT - MUSE: 358 MS
QTC - MUSE: 418 MS
R AXIS - MUSE: 20 DEGREES
SYSTOLIC BLOOD PRESSURE - MUSE: NORMAL MMHG
T AXIS - MUSE: 46 DEGREES
VENTRICULAR RATE- MUSE: 82 BPM

## 2022-02-22 PROCEDURE — 99213 OFFICE O/P EST LOW 20 MIN: CPT | Mod: 25 | Performed by: PEDIATRICS

## 2022-02-22 PROCEDURE — 20610 DRAIN/INJ JOINT/BURSA W/O US: CPT | Mod: LT | Performed by: PEDIATRICS

## 2022-02-22 RX ORDER — TRIAMCINOLONE ACETONIDE 40 MG/ML
40 INJECTION, SUSPENSION INTRA-ARTICULAR; INTRAMUSCULAR
Status: SHIPPED | OUTPATIENT
Start: 2022-02-22

## 2022-02-22 RX ORDER — LIDOCAINE HYDROCHLORIDE 10 MG/ML
2 INJECTION, SOLUTION INFILTRATION; PERINEURAL
Status: SHIPPED | OUTPATIENT
Start: 2022-02-22

## 2022-02-22 RX ADMIN — TRIAMCINOLONE ACETONIDE 40 MG: 40 INJECTION, SUSPENSION INTRA-ARTICULAR; INTRAMUSCULAR at 17:45

## 2022-02-22 RX ADMIN — LIDOCAINE HYDROCHLORIDE 2 ML: 10 INJECTION, SOLUTION INFILTRATION; PERINEURAL at 17:45

## 2022-02-22 NOTE — LETTER
REPORT OF WORK ABILITY    NOTE TO EMPLOYEE: You must promptly provide a copy of this report to your  employer or worker's compensation insurer, and Qualified Rehabilitation Consultant.    Date: February 22, 2022                    Employee Name: Scot Spaulding         YOB: 1960  Medical Record Number: [id number]   Soc.Sec.No: [id number]  Employer: None                Date of Injury: 12/11/21  Managed Care Organization / Insurance Company Name: UNKNOWN    Diagnosis: left shoulder injury, strain; partial thickness rotator cuff tearing; ongoing symptoms  Work Related: yes     MMI: NO  Permanent Partial Disability (PPD) likely: UNKNOWN    EMPLOYEE IS ABLE TO WORK: Return to work with restrictions on next scheduled work date. Restrictions in place until recheck, approximately 4 weeks.     RESTRICTIONS IF ANY:  Continue with current restrictions.  Lift, carry:  Up to 5 lbs on left  Push/Pull:  Up to 5 lbs on left  Shoulder/Elbow:  left Avoid gripping/grasping, no outstretched arm, Avoid repetitive motion and No operating machine/vibrating tools  May use left hand/arm as a helper for the right side if comfortable.  Ladder/Stair climb:  Not at all (0 hours) for ladders  Reach Above Shoulders:  Not at all (0 hours) on left    OTHER RESTRICTIONS: None    TREATMENT PLAN/NOTES: change work position for comfort, best work height mid chest to mid thigh, may need to restrict work activities for comfort, may continue medications as discussed, cold pack for comfort and Rest as needed.  Continue with physical therapy.  Left shoulder subacromial steroid injection done today.            Jamel ROLDAN

## 2022-02-22 NOTE — PROGRESS NOTES
ASSESSMENT & PLAN    Scot was seen today for recheck.    Diagnoses and all orders for this visit:    Strain of left shoulder, subsequent encounter  -     Large Joint Injection/Arthocentesis: L subacromial bursa    Traumatic incomplete tear of left rotator cuff, subsequent encounter  -     Large Joint Injection/Arthocentesis: L subacromial bursa      Increased pain, for which he was recently seen in the emergency department.  Reviewed options, including trial of subacromial steroid injection, continuing physical therapy, and referral on to orthopedic surgery.  He prefers to avoid surgery, so injection done today.  Continue with PT as well.  Updated workability letter, continue with restrictions.  Follow-up 4 weeks, sooner if needed.  Questions answered. Discussed signs and symptoms that may indicate more serious issues; the patient was instructed to seek appropriate care if noted. Scot indicates understanding of these issues and agrees with the plan.        See Patient Instructions  Patient Instructions   Left shoulder subacromial steroid injection done today.  Continue with physical therapy.  Updated workability letter, continue with same restrictions.  Continue monitoring over the next 4 weeks.    If you have any further questions for your physician or physician s care team you can call [phone] and use option 3 to leave a voice message. Calls received during business hours will be returned same day.      Injection Discharge Instructions      You may shower, however avoid swimming, tub baths or hot tubs for 24 hours following your procedure    You may have a mild to moderate increase in pain for several days following the injection.    It may take up to 14 days for the steroid medication to start working although you may feel the effect as early as a few days after the procedure.    You may use ice packs for 10-15 minutes, 3 to 4 times a day at the injection site for comfort    You may use anti-inflammatory  "medications (such as Ibuprofen or Aleve or Advil) if you are able to take safely, or acetaminophen (Tylenol) for pain control if necessary    If you were fasting, you may resume your normal diet and medications after the procedure    If you have diabetes, check your blood sugar more frequently than usual as your blood sugar may be higher than normal for 10-14 days following a steroid injection. Contact your doctor who manages your diabetes if your blood sugar is higher than usual      If you experience any of the following, call the clinic @ [phone]  - Fever over 100 degree F  - Swelling, bleeding, redness, drainage, warmth at the injection site  - New or worsening pain          [facility]    SUBJECTIVE- Interim History February 22, 2022    Chief Complaint   Patient presents with     Left Shoulder - RECHECK       Scot Spaulding is a 61 year old male who is seen in f/u up for Strain of left shoulder, subsequent encounter. Since last visit on 12/24/21 patient has continued to have pain his shoulder.   Was seen in the ER 2/18/22, due to an increase in pain in his shoulder and down his arm.  States he woke up in extreme pain causing him to go to the ER.  They did repeat x rays as well as a cardiac work up.  Was given a toradol injection as well as a morphine injection.     He states that he has not had any improvement with the PT, feels he is in more pain following all of his PT visits.    He continues to work within his restrictions.    12/11/21- Now ~ 10 weeks from initial injury  **  ED note and updated x-rays reviewed.      REVIEW OF SYSTEMS:  Review of Systems      OBJECTIVE:  /80   Ht 1.727 m (5' 8\")   Wt 102.1 kg (225 lb)   BMI 34.21 kg/m       Left shoulder:  Pain with motion away from body    RADIOLOGY:  Final results and radiologist's interpretation, available in the [facility] health record.  Images were reviewed with the patient in the " office today.  My personal interpretation of the performed imaging: no acute bony abnormality.        EXAM: XR SHOULDER LEFT G/E 3 VIEWS  LOCATION: [facility]  DATE/TIME: 2/18/2022 12:49 PM     INDICATION: Severe left shoulder pain.  COMPARISON: Left shoulder MRI 12/30/2021. Radiographs 12/15/2021.                                                                      IMPRESSION: Left shoulder negative for acute process or significant change from prior radiographs. Normal joint alignment. Mild degenerative change at the AC joint, within expected limits for age. No fracture or bone lesion.           =========  Previous MRI:    EXAM: MR left shoulder without  contrast 12/30/2021 7:53 AM     TECHNIQUE: Multiplanar, multisequence imaging of the left shoulder  were obtained without administration of intravenous or intra-articular  gadolinium contrast using routine protocol.     History: Shoulder pain, rotator cuff disorder suspected, xray done;  Strain of left shoulder, subsequent encounter     Additional Clinical information from EMR: Superolateral shoulder pain  and lateral shoulder pain increasing. New shoulder pain after heavy  snow shoveling approximately 12/13/2021.     Comparison: X-ray 12/15/2021     Findings:     ROTATOR CUFF and ASSOCIATED STRUCTURES  Rotator cuff: Superimposed on high-grade tendinopathy, focal, near  full-thickness, partial width tear of the supraspinatus  middle/posterior fibers with extension into the junctional fibers of  the infraspinatus tendon at the footprint. Additional articular sided  partial thickness, partial width tear of the posterior fibers of the  infraspinatus tendon with intrasubstance extension as evidenced by a  sentinel cyst within the infraspinatus muscle belly (series 8, image  19). No significant tendon retraction of the supraspinatus tendon  (series 6, image 24, series 8, image 15). Subcortical cystic and  edema-like changes. Superimposed on  high-grade tendinopathy, there is  a near full-thickness focal tear of the far superior fibers of the  subscapularis tendon with associated medial subluxation of the biceps  tendon. The teres minor tendon is intact.     Bursa: Trace subacromial and subdeltoid bursal fluid.     Musculature: Mild fatty atrophy of the supraspinatus and infraspinatus     Acromioclavicular joint  There are moderate degenerative changes of the acromioclavicular  joint. Acromion is type 2 in sagittal morphology.  Coracoacromial  ligament is not thickened.     OSSEOUS STRUCTURES  No fracture, marrow contusion or marrow infiltration.     LONG BICIPITAL TENDON  The long head of the biceps tendon is medially subluxed from the  bicipital groove. Intra-articular tendinosis and fraying at the  bicipital labral anchor.     GLENOHUMERAL JOINT  Joint fluid: Physiologic amount of joint fluid is  present.     Cartilage and subarticular bone:  No focal hyaline cartilage defects  are noted at the glenoid, cartilage thinning of the humeral head. No  Hill-Sachs, reverse Hill-Sachs, or bony Bankart lesions are seen.     Labrum: Attenuated appearance of the superior-posterior labrum just at  the bicipital labral anchor, likely degenerative tearing on this non  arthrographic exam.     ANCILLARY FINDINGS:  None                                                                      Impression:  1. Superimposed on high-grade tendinopathy, focal, near  full-thickness, partial width tear of the supraspinatus  middle/posterior fibers with extension into the junctional fibers of  the infraspinatus tendon at the footprint. Additional articular sided  partial thickness, partial width tear of the posterior fibers of the  infraspinatus tendon with intrasubstance extension as evidenced by a  sentinel cyst within the infraspinatus muscle. No significant tendon  retraction of the supraspinatus tendon. Mild associated muscle  atrophy.  2. Superimposed on high-grade  tendinopathy, near full-thickness focal  tear of the far superior fibers of the subscapularis tendon with  associated medial subluxation of the biceps tendon. Preserved muscle  bulk.  2. Moderate degenerative changes of the acromioclavicular joint.  3. Intra-articular biceps tendinosis.  4. Moderate grade biceps tendinopathy in its intra-articular course  extending into the bicipital labral anchor, associated mild  degeneration of the posterior superior labrum at the anchor.     I have personally reviewed the examination and initial interpretation  and I agree with the findings.     JUTTA ELLERMANN, MD           Large Joint Injection/Arthocentesis: L subacromial bursa    Date/Time: 2/22/2022 5:45 PM  Performed by: Jamel Cantu DO  Authorized by: Jamel Cantu DO     Indications:  Pain  Needle Size:  25 G  Guidance: landmark guided    Approach:  Posterolateral  Location:  Shoulder      Site:  L subacromial bursa  Medications:  40 mg triamcinolone 40 MG/ML; 2 mL lidocaine 1 %  Outcome:  Tolerated well, no immediate complications  Procedure discussed: discussed risks, benefits, and alternatives    Consent Given by:  Patient  Timeout: timeout called immediately prior to procedure    Prep: patient was prepped and draped in usual sterile fashion

## 2022-02-22 NOTE — LETTER
2/22/2022         RE: Scot Spaulding  [address]  [place] [address]        Dear Colleague,    Thank you for referring your patient, Scot Spaulding, to the [facility]. Please see a copy of my visit note below.    ASSESSMENT & PLAN    Scot was seen today for recheck.    Diagnoses and all orders for this visit:    Strain of left shoulder, subsequent encounter  -     Large Joint Injection/Arthocentesis: L subacromial bursa    Traumatic incomplete tear of left rotator cuff, subsequent encounter  -     Large Joint Injection/Arthocentesis: L subacromial bursa      Increased pain, for which he was recently seen in the emergency department.  Reviewed options, including trial of subacromial steroid injection, continuing physical therapy, and referral on to orthopedic surgery.  He prefers to avoid surgery, so injection done today.  Continue with PT as well.  Updated workability letter, continue with restrictions.  Follow-up 4 weeks, sooner if needed.  Questions answered. Discussed signs and symptoms that may indicate more serious issues; the patient was instructed to seek appropriate care if noted. Scot indicates understanding of these issues and agrees with the plan.        See Patient Instructions  Patient Instructions   Left shoulder subacromial steroid injection done today.  Continue with physical therapy.  Updated workability letter, continue with same restrictions.  Continue monitoring over the next 4 weeks.    If you have any further questions for your physician or physician s care team you can call [phone] and use option 3 to leave a voice message. Calls received during business hours will be returned same day.      Injection Discharge Instructions      You may shower, however avoid swimming, tub baths or hot tubs for 24 hours following your procedure    You may have a mild to moderate increase in pain for several days following the injection.    It may take up to  14 days for the steroid medication to start working although you may feel the effect as early as a few days after the procedure.    You may use ice packs for 10-15 minutes, 3 to 4 times a day at the injection site for comfort    You may use anti-inflammatory medications (such as Ibuprofen or Aleve or Advil) if you are able to take safely, or acetaminophen (Tylenol) for pain control if necessary    If you were fasting, you may resume your normal diet and medications after the procedure    If you have diabetes, check your blood sugar more frequently than usual as your blood sugar may be higher than normal for 10-14 days following a steroid injection. Contact your doctor who manages your diabetes if your blood sugar is higher than usual      If you experience any of the following, call the clinic @ [phone]  - Fever over 100 degree F  - Swelling, bleeding, redness, drainage, warmth at the injection site  - New or worsening pain          [facility]    SUBJECTIVE- Interim History February 22, 2022    Chief Complaint   Patient presents with     Left Shoulder - RECHECK       Scot Spaulding is a 61 year old male who is seen in f/u up for Strain of left shoulder, subsequent encounter. Since last visit on 12/24/21 patient has continued to have pain his shoulder.   Was seen in the ER 2/18/22, due to an increase in pain in his shoulder and down his arm.  States he woke up in extreme pain causing him to go to the ER.  They did repeat x rays as well as a cardiac work up.  Was given a toradol injection as well as a morphine injection.     He states that he has not had any improvement with the PT, feels he is in more pain following all of his PT visits.    He continues to work within his restrictions.    12/11/21- Now ~ 10 weeks from initial injury  **  ED note and updated x-rays reviewed.      REVIEW OF SYSTEMS:  Review of Systems      OBJECTIVE:  /80   Ht  "1.727 m (5' 8\")   Wt 102.1 kg (225 lb)   BMI 34.21 kg/m       Left shoulder:  Pain with motion away from body    RADIOLOGY:  Final results and radiologist's interpretation, available in the [facility] health record.  Images were reviewed with the patient in the office today.  My personal interpretation of the performed imaging: no acute bony abnormality.        EXAM: XR SHOULDER LEFT G/E 3 VIEWS  LOCATION: [facility]  DATE/TIME: 2/18/2022 12:49 PM     INDICATION: Severe left shoulder pain.  COMPARISON: Left shoulder MRI 12/30/2021. Radiographs 12/15/2021.                                                                      IMPRESSION: Left shoulder negative for acute process or significant change from prior radiographs. Normal joint alignment. Mild degenerative change at the AC joint, within expected limits for age. No fracture or bone lesion.           =========  Previous MRI:    EXAM: MR left shoulder without  contrast 12/30/2021 7:53 AM     TECHNIQUE: Multiplanar, multisequence imaging of the left shoulder  were obtained without administration of intravenous or intra-articular  gadolinium contrast using routine protocol.     History: Shoulder pain, rotator cuff disorder suspected, xray done;  Strain of left shoulder, subsequent encounter     Additional Clinical information from EMR: Superolateral shoulder pain  and lateral shoulder pain increasing. New shoulder pain after heavy  snow shoveling approximately 12/13/2021.     Comparison: X-ray 12/15/2021     Findings:     ROTATOR CUFF and ASSOCIATED STRUCTURES  Rotator cuff: Superimposed on high-grade tendinopathy, focal, near  full-thickness, partial width tear of the supraspinatus  middle/posterior fibers with extension into the junctional fibers of  the infraspinatus tendon at the footprint. Additional articular sided  partial thickness, partial width tear of the posterior fibers of the  infraspinatus tendon with intrasubstance extension as " evidenced by a  sentinel cyst within the infraspinatus muscle belly (series 8, image  19). No significant tendon retraction of the supraspinatus tendon  (series 6, image 24, series 8, image 15). Subcortical cystic and  edema-like changes. Superimposed on high-grade tendinopathy, there is  a near full-thickness focal tear of the far superior fibers of the  subscapularis tendon with associated medial subluxation of the biceps  tendon. The teres minor tendon is intact.     Bursa: Trace subacromial and subdeltoid bursal fluid.     Musculature: Mild fatty atrophy of the supraspinatus and infraspinatus     Acromioclavicular joint  There are moderate degenerative changes of the acromioclavicular  joint. Acromion is type 2 in sagittal morphology.  Coracoacromial  ligament is not thickened.     OSSEOUS STRUCTURES  No fracture, marrow contusion or marrow infiltration.     LONG BICIPITAL TENDON  The long head of the biceps tendon is medially subluxed from the  bicipital groove. Intra-articular tendinosis and fraying at the  bicipital labral anchor.     GLENOHUMERAL JOINT  Joint fluid: Physiologic amount of joint fluid is  present.     Cartilage and subarticular bone:  No focal hyaline cartilage defects  are noted at the glenoid, cartilage thinning of the humeral head. No  Hill-Sachs, reverse Hill-Sachs, or bony Bankart lesions are seen.     Labrum: Attenuated appearance of the superior-posterior labrum just at  the bicipital labral anchor, likely degenerative tearing on this non  arthrographic exam.     ANCILLARY FINDINGS:  None                                                                      Impression:  1. Superimposed on high-grade tendinopathy, focal, near  full-thickness, partial width tear of the supraspinatus  middle/posterior fibers with extension into the junctional fibers of  the infraspinatus tendon at the footprint. Additional articular sided  partial thickness, partial width tear of the posterior fibers of  the  infraspinatus tendon with intrasubstance extension as evidenced by a  sentinel cyst within the infraspinatus muscle. No significant tendon  retraction of the supraspinatus tendon. Mild associated muscle  atrophy.  2. Superimposed on high-grade tendinopathy, near full-thickness focal  tear of the far superior fibers of the subscapularis tendon with  associated medial subluxation of the biceps tendon. Preserved muscle  bulk.  2. Moderate degenerative changes of the acromioclavicular joint.  3. Intra-articular biceps tendinosis.  4. Moderate grade biceps tendinopathy in its intra-articular course  extending into the bicipital labral anchor, associated mild  degeneration of the posterior superior labrum at the anchor.     I have personally reviewed the examination and initial interpretation  and I agree with the findings.     JUTTA ELLERMANN, MD           Large Joint Injection/Arthocentesis: L subacromial bursa    Date/Time: 2/22/2022 5:45 PM  Performed by: Jamel Cantu DO  Authorized by: Jamel Cantu DO     Indications:  Pain  Needle Size:  25 G  Guidance: landmark guided    Approach:  Posterolateral  Location:  Shoulder      Site:  L subacromial bursa  Medications:  40 mg triamcinolone 40 MG/ML; 2 mL lidocaine 1 %  Outcome:  Tolerated well, no immediate complications  Procedure discussed: discussed risks, benefits, and alternatives    Consent Given by:  Patient  Timeout: timeout called immediately prior to procedure    Prep: patient was prepped and draped in usual sterile fashion                Again, thank you for allowing me to participate in the care of your patient.        Sincerely,        Jamel Cantu DO

## 2022-02-22 NOTE — PATIENT INSTRUCTIONS
Left shoulder subacromial steroid injection done today.  Continue with physical therapy.  Updated workability letter, continue with same restrictions.  Continue monitoring over the next 4 weeks.    If you have any further questions for your physician or physician s care team you can call [phone] and use option 3 to leave a voice message. Calls received during business hours will be returned same day.      Injection Discharge Instructions      You may shower, however avoid swimming, tub baths or hot tubs for 24 hours following your procedure    You may have a mild to moderate increase in pain for several days following the injection.    It may take up to 14 days for the steroid medication to start working although you may feel the effect as early as a few days after the procedure.    You may use ice packs for 10-15 minutes, 3 to 4 times a day at the injection site for comfort    You may use anti-inflammatory medications (such as Ibuprofen or Aleve or Advil) if you are able to take safely, or acetaminophen (Tylenol) for pain control if necessary    If you were fasting, you may resume your normal diet and medications after the procedure    If you have diabetes, check your blood sugar more frequently than usual as your blood sugar may be higher than normal for 10-14 days following a steroid injection. Contact your doctor who manages your diabetes if your blood sugar is higher than usual      If you experience any of the following, call the clinic @ [phone]  - Fever over 100 degree F  - Swelling, bleeding, redness, drainage, warmth at the injection site  - New or worsening pain

## 2022-03-09 ENCOUNTER — TELEPHONE (OUTPATIENT)
Dept: ORTHOPEDICS | Facility: CLINIC | Age: 62
End: 2022-03-09
Payer: OTHER MISCELLANEOUS

## 2022-03-09 DIAGNOSIS — S46.912D STRAIN OF LEFT SHOULDER, SUBSEQUENT ENCOUNTER: Primary | ICD-10-CM

## 2022-03-09 DIAGNOSIS — M25.512 ACUTE PAIN OF LEFT SHOULDER: ICD-10-CM

## 2022-03-09 DIAGNOSIS — S46.012D TRAUMATIC INCOMPLETE TEAR OF LEFT ROTATOR CUFF, SUBSEQUENT ENCOUNTER: ICD-10-CM

## 2022-03-09 NOTE — TELEPHONE ENCOUNTER
Called and spoke with patient.   He is agreeable to referral. Referral placed  All questions answered    Lexis Santiago MS ATC

## 2022-03-09 NOTE — TELEPHONE ENCOUNTER
Called and spoke with patient.  Feels he got only a few days of relief from the steroid injection.   Pain down his arm to his fingers.  Denies any neck pain.   Pain with laying face down.    Denies any numbness or tingling.       He has noticed that his blood sugar was  380 yesterday and 290 today.   He is wondering if it is related to the pain.  We did discss that steroid injections can rais his sugar leversl.  He states that it was in the 130's and less following the steroid injection.     He used the oxycodone in the past.  He states he has been using it when it is painful, but feels the pain is too great for it to help now.     Please advise    Lexis Santiago MS ATC

## 2022-03-09 NOTE — TELEPHONE ENCOUNTER
[facility] Call Center    Phone Message    May a detailed message be left on voicemail: yes     Reason for Call: Symptoms or Concerns     If patient has red-flag symptoms, warm transfer to triage line    Current symptom or concern: pain radiating to fingertips    Symptoms have been present for:  3 day(s)    Has patient previously been seen for this? No    By : N/A     Date: N/A    Are there any new or worsening symptoms? No      Action Taken: Message routed to:  Other: [facility]soc Be    Travel Screening: Not Applicable

## 2022-03-09 NOTE — TELEPHONE ENCOUNTER
Injury December 2021, has done PT, has done injection. MRI shows some rotator cuff tearing.  Would offer referral to ortho surgery next.  Otherwise, main option is continue with PT.  I would be happy to have a visit with the patient (in person, by video, or by phone) to discuss further if that would be helpful.  Thanks.  Jamel Cantu DO, CAQ

## 2022-03-16 ENCOUNTER — TRANSFERRED RECORDS (OUTPATIENT)
Dept: HEALTH INFORMATION MANAGEMENT | Facility: CLINIC | Age: 62
End: 2022-03-16
Payer: OTHER MISCELLANEOUS

## 2022-08-24 ENCOUNTER — APPOINTMENT (OUTPATIENT)
Dept: CT IMAGING | Facility: HOSPITAL | Age: 62
End: 2022-08-24
Attending: PHYSICIAN ASSISTANT
Payer: COMMERCIAL

## 2022-08-24 ENCOUNTER — HOSPITAL ENCOUNTER (EMERGENCY)
Facility: HOSPITAL | Age: 62
Discharge: HOME | End: 2022-08-24
Admitting: PHYSICIAN ASSISTANT
Payer: COMMERCIAL

## 2022-08-24 VITALS
HEART RATE: 79 BPM | BODY MASS INDEX: 33.33 KG/M2 | HEIGHT: 69 IN | OXYGEN SATURATION: 100 % | SYSTOLIC BLOOD PRESSURE: 127 MMHG | TEMPERATURE: 98 F | DIASTOLIC BLOOD PRESSURE: 80 MMHG | RESPIRATION RATE: 18 BRPM | WEIGHT: 225 LBS

## 2022-08-24 DIAGNOSIS — A08.4 VIRAL GASTROENTERITIS: ICD-10-CM

## 2022-08-24 PROBLEM — G89.29 CHRONIC BILATERAL LOW BACK PAIN WITHOUT SCIATICA: Status: ACTIVE | Noted: 2018-12-12

## 2022-08-24 PROBLEM — Z79.4 TYPE 2 DIABETES MELLITUS WITH MICROALBUMINURIA, WITH LONG-TERM CURRENT USE OF INSULIN (H): Status: ACTIVE | Noted: 2018-01-04

## 2022-08-24 PROBLEM — M54.50 CHRONIC BILATERAL LOW BACK PAIN WITHOUT SCIATICA: Status: ACTIVE | Noted: 2018-12-12

## 2022-08-24 PROBLEM — E78.2 COMBINED HYPERLIPIDEMIA: Status: ACTIVE | Noted: 2020-06-02

## 2022-08-24 PROBLEM — K22.4 ESOPHAGEAL SPASM: Status: ACTIVE | Noted: 2020-02-06

## 2022-08-24 PROBLEM — A60.01 HERPES SIMPLEX INFECTION OF PENIS: Status: ACTIVE | Noted: 2018-01-04

## 2022-08-24 PROBLEM — E11.29 TYPE 2 DIABETES MELLITUS WITH MICROALBUMINURIA, WITH LONG-TERM CURRENT USE OF INSULIN (H): Status: ACTIVE | Noted: 2018-01-04

## 2022-08-24 PROBLEM — E55.9 VITAMIN D DEFICIENCY: Status: ACTIVE | Noted: 2020-06-02

## 2022-08-24 PROBLEM — R80.9 TYPE 2 DIABETES MELLITUS WITH MICROALBUMINURIA, WITH LONG-TERM CURRENT USE OF INSULIN (H): Status: ACTIVE | Noted: 2018-01-04

## 2022-08-24 PROBLEM — K21.9 GASTROESOPHAGEAL REFLUX DISEASE WITHOUT ESOPHAGITIS: Status: ACTIVE | Noted: 2020-02-06

## 2022-08-24 PROBLEM — E53.1: Status: ACTIVE | Noted: 2021-01-19

## 2022-08-24 PROBLEM — R80.9 MICROALBUMINURIA: Status: ACTIVE | Noted: 2020-06-10

## 2022-08-24 LAB
ALBUMIN SERPL-MCNC: 4.2 G/DL (ref 3.5–5)
ALP SERPL-CCNC: 81 U/L (ref 45–120)
ALT SERPL W P-5'-P-CCNC: 37 U/L (ref 0–45)
ANION GAP SERPL CALCULATED.3IONS-SCNC: 11 MMOL/L (ref 5–18)
AST SERPL W P-5'-P-CCNC: 20 U/L (ref 0–40)
BASOPHILS # BLD AUTO: 0.1 10E3/UL (ref 0–0.2)
BASOPHILS NFR BLD AUTO: 0 %
BILIRUB SERPL-MCNC: 1.2 MG/DL (ref 0–1)
BUN SERPL-MCNC: 13 MG/DL (ref 8–22)
CALCIUM SERPL-MCNC: 10.1 MG/DL (ref 8.5–10.5)
CHLORIDE BLD-SCNC: 104 MMOL/L (ref 98–107)
CO2 SERPL-SCNC: 26 MMOL/L (ref 22–31)
CREAT SERPL-MCNC: 1.18 MG/DL (ref 0.7–1.3)
EOSINOPHIL # BLD AUTO: 0.1 10E3/UL (ref 0–0.7)
EOSINOPHIL NFR BLD AUTO: 1 %
ERYTHROCYTE [DISTWIDTH] IN BLOOD BY AUTOMATED COUNT: 12.1 % (ref 10–15)
GFR SERPL CREATININE-BSD FRML MDRD: 70 ML/MIN/1.73M2
GLUCOSE BLD-MCNC: 150 MG/DL (ref 70–125)
HCT VFR BLD AUTO: 50.9 % (ref 40–53)
HGB BLD-MCNC: 18.1 G/DL (ref 13.3–17.7)
IMM GRANULOCYTES # BLD: 0.1 10E3/UL
IMM GRANULOCYTES NFR BLD: 1 %
LIPASE SERPL-CCNC: 75 U/L (ref 0–52)
LYMPHOCYTES # BLD AUTO: 2.9 10E3/UL (ref 0.8–5.3)
LYMPHOCYTES NFR BLD AUTO: 26 %
MCH RBC QN AUTO: 31.7 PG (ref 26.5–33)
MCHC RBC AUTO-ENTMCNC: 35.6 G/DL (ref 31.5–36.5)
MCV RBC AUTO: 89 FL (ref 78–100)
MONOCYTES # BLD AUTO: 0.8 10E3/UL (ref 0–1.3)
MONOCYTES NFR BLD AUTO: 7 %
NEUTROPHILS # BLD AUTO: 7.4 10E3/UL (ref 1.6–8.3)
NEUTROPHILS NFR BLD AUTO: 65 %
NRBC # BLD AUTO: 0 10E3/UL
NRBC BLD AUTO-RTO: 0 /100
PLATELET # BLD AUTO: 233 10E3/UL (ref 150–450)
POTASSIUM BLD-SCNC: 4.4 MMOL/L (ref 3.5–5)
PROT SERPL-MCNC: 7.4 G/DL (ref 6–8)
RBC # BLD AUTO: 5.71 10E6/UL (ref 4.4–5.9)
SODIUM SERPL-SCNC: 141 MMOL/L (ref 136–145)
WBC # BLD AUTO: 11.3 10E3/UL (ref 4–11)

## 2022-08-24 PROCEDURE — 74177 CT ABD & PELVIS W/CONTRAST: CPT

## 2022-08-24 PROCEDURE — 250N000011 HC RX IP 250 OP 636: Performed by: PHYSICIAN ASSISTANT

## 2022-08-24 PROCEDURE — 96361 HYDRATE IV INFUSION ADD-ON: CPT

## 2022-08-24 PROCEDURE — 250N000013 HC RX MED GY IP 250 OP 250 PS 637: Performed by: PHYSICIAN ASSISTANT

## 2022-08-24 PROCEDURE — 83690 ASSAY OF LIPASE: CPT | Performed by: PHYSICIAN ASSISTANT

## 2022-08-24 PROCEDURE — 258N000003 HC RX IP 258 OP 636: Performed by: PHYSICIAN ASSISTANT

## 2022-08-24 PROCEDURE — 96374 THER/PROPH/DIAG INJ IV PUSH: CPT | Mod: 59

## 2022-08-24 PROCEDURE — 36415 COLL VENOUS BLD VENIPUNCTURE: CPT | Performed by: PHYSICIAN ASSISTANT

## 2022-08-24 PROCEDURE — 80053 COMPREHEN METABOLIC PANEL: CPT | Performed by: PHYSICIAN ASSISTANT

## 2022-08-24 PROCEDURE — 250N000009 HC RX 250: Performed by: PHYSICIAN ASSISTANT

## 2022-08-24 PROCEDURE — 99285 EMERGENCY DEPT VISIT HI MDM: CPT | Mod: 25

## 2022-08-24 PROCEDURE — 85025 COMPLETE CBC W/AUTO DIFF WBC: CPT | Performed by: PHYSICIAN ASSISTANT

## 2022-08-24 RX ORDER — IOPAMIDOL 755 MG/ML
75 INJECTION, SOLUTION INTRAVASCULAR ONCE
Status: COMPLETED | OUTPATIENT
Start: 2022-08-24 | End: 2022-08-24

## 2022-08-24 RX ORDER — ONDANSETRON 4 MG/1
4 TABLET, ORALLY DISINTEGRATING ORAL EVERY 8 HOURS PRN
Qty: 10 TABLET | Refills: 0 | Status: SHIPPED | OUTPATIENT
Start: 2022-08-24 | End: 2022-08-27

## 2022-08-24 RX ORDER — ONDANSETRON 2 MG/ML
4 INJECTION INTRAMUSCULAR; INTRAVENOUS ONCE
Status: COMPLETED | OUTPATIENT
Start: 2022-08-24 | End: 2022-08-24

## 2022-08-24 RX ADMIN — ONDANSETRON 4 MG: 2 INJECTION INTRAMUSCULAR; INTRAVENOUS at 12:13

## 2022-08-24 RX ADMIN — LIDOCAINE HYDROCHLORIDE 30 ML: 20 SOLUTION ORAL; TOPICAL at 12:18

## 2022-08-24 RX ADMIN — SODIUM CHLORIDE 1000 ML: 9 INJECTION, SOLUTION INTRAVENOUS at 12:25

## 2022-08-24 RX ADMIN — IOPAMIDOL 75 ML: 755 INJECTION, SOLUTION INTRAVENOUS at 11:57

## 2022-08-24 ASSESSMENT — ENCOUNTER SYMPTOMS
BLOOD IN STOOL: 0
NUMBNESS: 0
HEMATURIA: 0
ARTHRALGIAS: 1
DIARRHEA: 1
FREQUENCY: 0
FEVER: 0
APPETITE CHANGE: 1
HEADACHES: 1
DYSURIA: 0
VOMITING: 1
ABDOMINAL PAIN: 0
DIFFICULTY URINATING: 0

## 2022-08-24 ASSESSMENT — ACTIVITIES OF DAILY LIVING (ADL): ADLS_ACUITY_SCORE: 35

## 2022-08-24 NOTE — ED PROVIDER NOTES
EMERGENCY DEPARTMENT ENCOUNTER      NAME: Scot Spaulding  AGE: 61 year old male  YOB: 1960  MRN: [id number]  EVALUATION DATE & TIME: No admission date for patient encounter.    PCP: Tj Juan    ED PROVIDER: Juanito Alvarado PA-C      Chief Complaint   Patient presents with     Nausea, Vomiting, & Diarrhea         FINAL IMPRESSION:  1. Viral gastroenteritis      ED COURSE & MEDICAL DECISION MAKING:    Pertinent Labs & Imaging studies reviewed. (See chart for details)  11:20 AM I met the patient and performed my initial interview and exam.   1:31 PM I rechecked and updated the patient. I discussed the plan for discharge with the patient, and patient is agreeable. We discussed supportivecares at home and reasons for return to the ER including new or worsening symptoms - all questions and concerns addressed. Patient to be discharged by RN.     61 year old male presents to the Emergency Department for evaluation of nausea, vomiting, diarrhea.     ED Course as of 08/24/22 1354   Wed Aug 24, 2022   1128 Is a 61-year-old male who presents emergency department for evaluation of headache, nausea, vomiting which is been ongoing for the last couple of days.  Patient notes that he did take some Zofran this morning which did help with the nausea.  He does note multiple episodes of diarrhea over the last couple days, no blood in diarrhea.  Stool seems somewhat formed, is not completely liquid or consistent with C. difficile.  Patient does note that he has a history of type 2 diabetes, has not tested his blood sugar today.  No abdominal pain, no CVA tenderness, no urinary changes.  No chest pain or shortness of breath.  No history of alcohol use.  Does note a little bit of a headache, feeling somewhat chilled, however no fevers here.  Given the elevation in his lipase, will obtain a CT scan of his abdomen pelvis.  He is mildly elevated white blood cell count, this could be secondary to cholecystitis,  choledocholithiasis, pancreatitis, other acute abdominal infection, viral gastroenteritis.  We will plan for a CT scan of his abdomen pelvis to evaluate for any intra-abdominal pathology, given a dose of GI cocktail here, as well as some Zofran.  Remainder of his laboratory studies were done prior to my initial evaluation, he has a mild elevated lipase, however the rest labs are relatively normal.   1221 CT Abdomen Pelvis w Contrast  CT abdomen pelvis does not show any obvious acute findings to explain symptoms, no significant peripancreatic stranding however low pancreatitis.  No bowel obstruction or inflammation.   1246 Patient seen and evaluated again, updated on laboratory results.  Discussed that this may be something related to irritable bowel disease if he is continue to have symptoms for such a long time, he has follow-up with GI, as well as her primary care doctor for possible colonoscopy.  No other acute abnormalities at this time, CT does not show any acute normalities.  We will put in a consult for him to see GI, he will get the rest of the fluids here, will plan for discharge.        At the conclusion of the encounter I discussed the results of all of the tests and the disposition. The questions were answered. The patient or family acknowledged understanding and was agreeable with the care plan.       0 minutes of critical care time     MEDICATIONS GIVEN IN THE EMERGENCY:  Medications   ondansetron (ZOFRAN) injection 4 mg (4 mg Intravenous Given 8/24/22 1213)   lidocaine (viscous) (XYLOCAINE) 2 % 15 mL, alum & mag hydroxide-simethicone (MAALOX) 15 mL GI Cocktail (30 mLs Oral Given 8/24/22 1218)   0.9% sodium chloride BOLUS (1,000 mLs Intravenous New Bag 8/24/22 1225)   iopamidol (ISOVUE-370) solution 75 mL (75 mLs Intravenous Given 8/24/22 1157)       NEW PRESCRIPTIONS STARTED AT TODAY'S ER VISIT  New Prescriptions    ONDANSETRON (ZOFRAN ODT) 4 MG ODT TAB    Take 1 tablet (4 mg) by mouth every 8 hours  as needed for nausea          =================================================================    HPI    Patient information was obtained from: the patient    Use of : N/A      Scot Spaulding is a 61 year old year old male with a pertinent medical history of chronic bilateral lower back pain, esophageal spasm, GERD, type II DM who presents to the ED by personal vehicle for the evaluation of nausea, vomiting, diarrhea.    Per chart review, the patient presented to the [facility] on 7/11/22 for diarrhea, nausea, vomiting.  He had 2 L of fluid here as well as Zofran and was feeling better. Glucose elevated to 421. Lipase elevated to 408. Negative COVID-19. CT abdomen pelvis without definite etiology to the left lower quadrant.  Sigmoid colon lesion versus focal contraction. Recommend correlation with most recent colonoscopy today to determine if repeat colonoscopy mandated. Enlarged prostate. Coronary atherosclerosis.     The patient presents to the ED reporting at 0200 he woke with diarrhea, vomiting, and shaky.  He took a Zofran and went back to bed.  At 0800, the patient noted bilateral foot pain, cramping right medial thigh pain, headache, and continued diarrhea.      Of note, the patient endorses several months of elevated BG levels in the 300s, fluctuating up and down. He is a type I diabetic on 60 units of insulin. However, further chart review showed the patient is on Lantus and has type II DM.  Also has had several months of decreased appetite and bilateral foot cold.    The patient denies abdominal pain, urinary symptoms, fever, numbness, blood in stool, and any other symptoms or complaints at this time.     MHx: Covid vaccinated without booster.    SHx: No alcohol use.        REVIEW OF SYSTEMS   Review of Systems   Constitutional: Positive for appetite change (decreased - months). Negative for fever.        Positive for shakiness.    Gastrointestinal: Positive for diarrhea and vomiting. Negative  "for abdominal pain and blood in stool.   Genitourinary: Negative for decreased urine volume, difficulty urinating, dysuria, enuresis, frequency, hematuria and urgency.   Musculoskeletal: Positive for arthralgias (bilateral foot and medial right thigh).        Positive for months of bilateral foot coldness.    Neurological: Positive for headaches. Negative for numbness.   All other systems reviewed and are negative.    PAST MEDICAL HISTORY:  Past Medical History:   Diagnosis Date     Diabetes (H)        PAST SURGICAL HISTORY:  No past surgical history on file.        CURRENT MEDICATIONS:    ondansetron (ZOFRAN ODT) 4 MG ODT tab  atorvastatin (LIPITOR) 20 MG tablet  Continuous Blood Gluc Sensor (FREESTYLE HÉCTOR 14 DAY SENSOR) MISC  gabapentin (NEURONTIN) 100 MG capsule  glucose (BD GLUCOSE) 4 g chewable tablet  LANTUS SOLOSTAR 100 UNIT/ML soln  oxyCODONE (ROXICODONE) 5 MG tablet  oxyCODONE (ROXICODONE) 5 MG tablet         ALLERGIES:  No Known Allergies    FAMILY HISTORY:  No family history on file.    SOCIAL HISTORY:   Social History     Socioeconomic History     Marital status: Single   Tobacco Use     Smoking status: Never Smoker     Smokeless tobacco: Never Used       VITALS:  /80   Pulse 79   Temp 98  F (36.7  C)   Resp 18   Ht 1.753 m (5' 9\")   Wt 102.1 kg (225 lb)   SpO2 100%   BMI 33.23 kg/m      PHYSICAL EXAM    Physical Exam  Vitals and nursing note reviewed.   Constitutional:       General: He is not in acute distress.     Appearance: Normal appearance. He is normal weight. He is not toxic-appearing or diaphoretic.   HENT:      Right Ear: External ear normal.      Left Ear: External ear normal.   Eyes:      Conjunctiva/sclera: Conjunctivae normal.   Pulmonary:      Effort: Pulmonary effort is normal.   Abdominal:      General: Abdomen is flat. There is no distension.      Palpations: Abdomen is soft.      Tenderness: There is no abdominal tenderness. There is no right CVA tenderness, left CVA " tenderness, guarding or rebound.   Neurological:      Mental Status: He is alert. Mental status is at baseline.           LAB:  All pertinent labs reviewed and interpreted.  Labs Ordered and Resulted from Time of ED Arrival to Time of ED Departure   COMPREHENSIVE METABOLIC PANEL - Abnormal       Result Value    Sodium 141      Potassium 4.4      Chloride 104      Carbon Dioxide (CO2) 26      Anion Gap 11      Urea Nitrogen 13      Creatinine 1.18      Calcium 10.1      Glucose 150 (*)     Alkaline Phosphatase 81      AST 20      ALT 37      Protein Total 7.4      Albumin 4.2      Bilirubin Total 1.2 (*)     GFR Estimate 70     LIPASE - Abnormal    Lipase 75 (*)    CBC WITH PLATELETS AND DIFFERENTIAL - Abnormal    WBC Count 11.3 (*)     RBC Count 5.71      Hemoglobin 18.1 (*)     Hematocrit 50.9      MCV 89      MCH 31.7      MCHC 35.6      RDW 12.1      Platelet Count 233      % Neutrophils 65      % Lymphocytes 26      % Monocytes 7      % Eosinophils 1      % Basophils 0      % Immature Granulocytes 1      NRBCs per 100 WBC 0      Absolute Neutrophils 7.4      Absolute Lymphocytes 2.9      Absolute Monocytes 0.8      Absolute Eosinophils 0.1      Absolute Basophils 0.1      Absolute Immature Granulocytes 0.1      Absolute NRBCs 0.0         RADIOLOGY:  Reviewed all pertinent imaging. Please see official radiology report.  CT Abdomen Pelvis w Contrast   Final Result   IMPRESSION:       1.  No obvious acute findings to explain symptoms.      2.  No significant peripancreatic stranding or overt pancreatitis at this time.      3.  No bowel obstruction or inflammation.                PROCEDURES:   None.       I, Orville Mcghee, am serving as a scribe to document services personally performed by Juanito Alvarado PA-C, based on my observation and the provider's statements to me. I, Juanito Alvarado PA-C, attest that Orville Mcghee is acting in a scribe capacity, has observed my performance of the services and has  documented them in accordance with my direction.    Juanito Alvarado PA-C  Emergency Medicine  [facility] EMERGENCY DEPARTMENT  [facility] [place] [id number] [phone]  Dept: [phone]     Juanito Alvarado PA-C  08/24/22 0118

## 2022-08-24 NOTE — DISCHARGE INSTRUCTIONS
You are seen here in the emergency department for evaluation of diarrhea.  It sounds like this is persistent.  Your laboratory studies here are unremarkable, your CT scan does not show any acute abnormalities.  I recommend he follow-up with your primary care doctor, additionally I placed a GI consult, they should call you to schedule an appointment.  You are likely due for a colonoscopy, as well as possible further work-up by GI for this ongoing diarrhea.  It may be that this is secondary to irritable bowel, or other abnormality, however you would need further testing to rule any of these things in or out.  For pain at home, you can use ibuprofen or Tylenol.    For pain or fever you may use:  -Tylenol 650 mg every 6 hours.  Max 4000 mg in 24 hours  Do not use thismedication with alcohol as it can cause liver problems.  -Ibuprofen 600 mg every 6 hours.  Max 3500 mg in 24 hours  Do not take this medication if you have a history of a GI bleed or have kidney problems.  You may use both of these medications at the same time or you can alternate them every 3 hours.  For example, Tylenol at 6 AM, ibuprofen at 9 AM, Tylenol at noon, etc.

## 2022-08-24 NOTE — ED TRIAGE NOTES
Triage Assessment     Row Name 08/24/22 1011       Triage Assessment (Adult)    Airway WDL WDL            Pt with c/o n/v/d along with a headache.  Pt also reports right thigh pain, and bilateral foot cramps along with feet feeling cold.  Pt has taken zofran for the nausea and it helped a little bit.Loss of appetite for a month, headache since yesterday and n/v/d couple days off/on.

## 2022-10-03 ENCOUNTER — HEALTH MAINTENANCE LETTER (OUTPATIENT)
Age: 62
End: 2022-10-03

## 2022-10-15 ENCOUNTER — HOSPITAL ENCOUNTER (EMERGENCY)
Facility: HOSPITAL | Age: 62
Discharge: HOME | End: 2022-10-15
Attending: EMERGENCY MEDICINE | Admitting: EMERGENCY MEDICINE
Payer: COMMERCIAL

## 2022-10-15 ENCOUNTER — APPOINTMENT (OUTPATIENT)
Dept: RADIOLOGY | Facility: HOSPITAL | Age: 62
End: 2022-10-15
Attending: EMERGENCY MEDICINE
Payer: COMMERCIAL

## 2022-10-15 VITALS
BODY MASS INDEX: 33.33 KG/M2 | DIASTOLIC BLOOD PRESSURE: 80 MMHG | OXYGEN SATURATION: 98 % | RESPIRATION RATE: 17 BRPM | SYSTOLIC BLOOD PRESSURE: 118 MMHG | HEART RATE: 88 BPM | TEMPERATURE: 97 F | WEIGHT: 225 LBS | HEIGHT: 69 IN

## 2022-10-15 DIAGNOSIS — T14.8XXA ANIMAL BITE: ICD-10-CM

## 2022-10-15 PROCEDURE — 73140 X-RAY EXAM OF FINGER(S): CPT | Mod: RT

## 2022-10-15 PROCEDURE — 99283 EMERGENCY DEPT VISIT LOW MDM: CPT

## 2022-10-15 PROCEDURE — 250N000013 HC RX MED GY IP 250 OP 250 PS 637: Performed by: EMERGENCY MEDICINE

## 2022-10-15 RX ORDER — ACETAMINOPHEN 325 MG/1
650 TABLET ORAL ONCE
Status: COMPLETED | OUTPATIENT
Start: 2022-10-15 | End: 2022-10-15

## 2022-10-15 RX ADMIN — ACETAMINOPHEN 650 MG: 325 TABLET, FILM COATED ORAL at 12:11

## 2022-10-15 RX ADMIN — AMOXICILLIN AND CLAVULANATE POTASSIUM 1 TABLET: 875; 125 TABLET, FILM COATED ORAL at 12:11

## 2022-10-15 ASSESSMENT — ENCOUNTER SYMPTOMS: WOUND: 1

## 2022-10-15 NOTE — DISCHARGE INSTRUCTIONS
Read and follow the discharge instructions.    Keep the fingers clean and dry    Take antibiotics as instructed.    You Need to have your finger rechecked in 48 hours to make sure that he is healing.  Follow-up with the number provided or you could return to the emergency department.    Immediately for worsening pain redness pus discharge or any other concerns.

## 2022-10-15 NOTE — Clinical Note
Scot Spaulding was seen and treated in our emergency department on 10/15/2022.  He may return to work on 10/17/2022.       If you have any questions or concerns, please don't hesitate to call.      Estephania Saenz MD
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
## 2023-02-12 ENCOUNTER — HEALTH MAINTENANCE LETTER (OUTPATIENT)
Age: 63
End: 2023-02-12

## 2023-02-13 ENCOUNTER — APPOINTMENT (OUTPATIENT)
Dept: CT IMAGING | Facility: HOSPITAL | Age: 63
End: 2023-02-13
Attending: EMERGENCY MEDICINE
Payer: OTHER MISCELLANEOUS

## 2023-02-13 ENCOUNTER — HOSPITAL ENCOUNTER (EMERGENCY)
Facility: HOSPITAL | Age: 63
Discharge: HOME | End: 2023-02-13
Attending: EMERGENCY MEDICINE | Admitting: EMERGENCY MEDICINE
Payer: OTHER MISCELLANEOUS

## 2023-02-13 VITALS
HEART RATE: 89 BPM | WEIGHT: 208.1 LBS | HEIGHT: 69 IN | SYSTOLIC BLOOD PRESSURE: 167 MMHG | OXYGEN SATURATION: 97 % | TEMPERATURE: 98.6 F | DIASTOLIC BLOOD PRESSURE: 87 MMHG | BODY MASS INDEX: 30.82 KG/M2 | RESPIRATION RATE: 19 BRPM
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
## 2023-02-13 DIAGNOSIS — S00.01XA SCALP ABRASION, INITIAL ENCOUNTER: ICD-10-CM

## 2023-02-13 DIAGNOSIS — S00.03XA SCALP HEMATOMA, INITIAL ENCOUNTER: ICD-10-CM

## 2023-02-13 PROCEDURE — 70450 CT HEAD/BRAIN W/O DYE: CPT

## 2023-02-13 PROCEDURE — 99284 EMERGENCY DEPT VISIT MOD MDM: CPT | Mod: 25

## 2023-02-13 PROCEDURE — 72125 CT NECK SPINE W/O DYE: CPT

## 2023-02-13 NOTE — ED PROVIDER NOTES
EMERGENCY DEPARTMENT ENCOUNTER      NAME: Scot Spaulding  AGE: 62 year old male  YOB: 1960  MRN: [id number]  EVALUATION DATE & TIME: 2023  1:06 PM    PCP: Tj Juan    ED PROVIDER: Marco Antonio Banda M.D.      Chief Complaint   Patient presents with     Fall     Laceration         FINAL IMPRESSION:  1. Scalp hematoma, initial encounter    2. Fall, initial encounter    3. Scalp abrasion, initial encounter          ED COURSE & MEDICAL DECISION MAKIN:12 PM Met with patient for initial interview and exam. Discussed initial plan for care for their stay in the emergency department.  PPE: Provider wore gloves, N95 mask, eye protection.  Pertinent Labs & Imaging studies reviewed. (See chart for details)  62 year old male presents to the Emergency Department for evaluation of head pain, fall. Patient appears non toxic with stable vitals signs, patient afebrile with no tachycardia or hypoxia, no increased work of breathing.  Lungs clear and abdomen benign, extremity exam is benign.  Patient has ongoing left shoulder pain from recent rotator cuff surgery but denies hitting his left shoulder and denies any new pain, he is neurovascular intact with good distal sensation and capillary refill.  Exam significant for superficial abrasion to the posterior scalp with mild hematoma.  CT imaging of the head and cervical spine were ordered from triage.  Imaging studies returned reported no acute concerning findings.  On my exam patient appears quite well and comfortable, has no midline cervical tenderness.  Nothing to suggest intrathoracic, intra-abdominal or new extremity trauma.  Again reassured by negative CT imaging here.  Steri-Strips applied to abrasion for wound supports, certainly nothing that requires staple or suture repair.  With benign exam, negative work-up and well appearance feel he is safe for discharge and close follow-up.  Will recommend conservative management with Tylenol ibuprofen for pain  and close follow-up with his primary care provider in the next 5 to 7 days.  Discussed all these findings recommendations with the patient felt reassured and comfortable discharge.  Return precautions provided.      Medical Decision Making    History:    Supplemental history from: Documented in chart, if applicable    External Record(s) reviewed: Documented in chart, if applicable.    Work Up:    Chart documentation includes differential considered and any EKGs or imaging independently interpreted by provider, where specified.    In additional to work up documented, I considered the following work up: Documented in chart, if applicable.    External consultation:    Discussion of management with another provider: Documented in chart, if applicable    Complicating factors:    Care impacted by chronic illness: Chronic Pain, Diabetes and Hyperlipidemia    Care affected by social determinants of health: N/A    Disposition considerations: Discharge. No recommendations on prescription strength medication(s). N/A.          At the conclusion of the encounter I discussed the results of all of the tests and the disposition. The questions were answered and return precautions provided. The patient or family acknowledged understanding and was agreeable with the care plan.         MEDICATIONS GIVEN IN THE EMERGENCY:  Medications - No data to display    NEW PRESCRIPTIONS STARTED AT TODAY'S ER VISIT  New Prescriptions    No medications on file            =================================================================    HPI    Patient information was obtained from: Patient    Use of Intrepreter: N/A         Scot Spaulding is a 62 year old male with a pertinent PMHx of DM II, HLD and chronic bialteral low back pain without sciatica, who presents via private vehicle for the evaluation of a mechanical fall.     Per patient he notes having been at work earlier today when he was walking to a different unit outside. Patient stepped on  ice and slipped falling backwards hitting a corner of a wall and the back of his head. Patient states he did not loss consciousness but did have bleeding from the back of his head. He notes having ongoing pain to his left shoulder which he notes is from his rotator cuff and is suppose to meet with his surgeon tomorrow. He denies any new neck pain or back pain as well as any other complaints at the time.     Per chart review patient's last Tdap done 4/30/2018.     REVIEW OF SYSTEMS   Constitutional:  Denies fever, chills  Respiratory:  Denies productive cough or increased work of breathing  Cardiovascular:  Denies chest pain, palpitations  GI:  Denies abdominal pain, nausea, vomiting, or change in bowel or bladder habits   Musculoskeletal:  Denies any new muscle/joint swelling, neck pain, back pain. Endorses ongoing left shoulder pain.   Skin:  Denies rash. Endorses wound to back of head.    Neurologic:  Denies focal weakness, loss of consciousness  All systems negative except as marked.     PAST MEDICAL HISTORY:  Past Medical History:   Diagnosis Date     Diabetes (H)        PAST SURGICAL HISTORY:  History reviewed. No pertinent surgical history.      CURRENT MEDICATIONS:    Prior to Admission medications    Medication Sig Start Date End Date Taking? Authorizing Provider   atorvastatin (LIPITOR) 20 MG tablet  8/24/21   Reported, Patient   Continuous Blood Gluc Sensor (FREESTYLE HÉCTOR 14 DAY SENSOR) [facility]  10/27/21   Reported, Patient   gabapentin (NEURONTIN) 100 MG capsule  8/25/21   Reported, Patient   glucose (BD GLUCOSE) 4 g chewable tablet Chew 3-4 tablets only as needed in case of low blood sugar (<70 mg/dL) 9/1/21   Reported, Patient   LANTUS SOLOSTAR 100 UNIT/ML soln  10/7/21   Reported, Patient   oxyCODONE (ROXICODONE) 5 MG tablet Take 1 tablet (5 mg) by mouth every 6 hours as needed for pain 2/18/22   Ava Underwood PA-C   oxyCODONE (ROXICODONE) 5 MG tablet Take 1 tablet (5 mg) by mouth every 8 hours as  "needed for severe pain 12/24/21   Jamel Cantu Tobar, DO        ALLERGIES:  No Known Allergies    FAMILY HISTORY:  No family history on file.    SOCIAL HISTORY:   Social History     Socioeconomic History     Marital status: Single   Tobacco Use     Smoking status: Never     Smokeless tobacco: Never       VITALS:  Patient Vitals for the past 24 hrs:   BP Temp Temp src Pulse Resp SpO2 Height Weight   02/13/23 1142 (!) 167/87 98.6  F (37  C) Oral 89 19 97 % 1.753 m (5' 9\") 94.4 kg (208 lb 1.6 oz)        PHYSICAL EXAM    Constitutional:  Awake, alert, in no apparent distress  HENT:  No skull depressions, superficial laceration with scalp hematoma to the posterior scalp, no signs of basilar skull injury, Bilateral external ears normal with no blood behind the TMs, Oropharynx moist with no signs of acute dental trauma, Nose normal with no septal hematoma. Neck- Normal range of motion with no guarding, No midline cervical tenderness, Supple, No stridor.   Eyes:  PERRL, EOMI with no signs of entrapment, Conjunctiva normal, No discharge.   Respiratory:  Normal breath sounds, No respiratory distress, No wheezing.  No signs of flail chest  Cardiovascular:  Normal heart rate, Normal rhythm, No appreciable rubs or gallops.   GI:  Soft, No tenderness, No distension, No palpable masses  Musculoskeletal:  Intact distal pulses, No edema. Good range of motion in all major joints. No tenderness to palpation or major deformities noted.  Back-nontender along midline cervical, thoracic and lumbar spine with no step-offs or signs of trauma.  Pelvis is stable.  Integument:  Warm, Dry, No erythema, No rash.   Neurologic:  Alert & oriented, Normal motor function, Normal sensory function, No focal deficits noted.   Psychiatric:  Affect normal, Judgment normal, Mood normal.       LAB:  All pertinent labs reviewed and interpreted.  Results for orders placed or performed during the hospital encounter of 02/13/23   CT Head w/o Contrast    " Impression    IMPRESSION:  HEAD CT:  1.  No acute intracranial abnormality.  2.  No calvarial or skull base fracture.    CERVICAL SPINE CT:  1.  No CT evidence for acute fracture or post traumatic subluxation.   CT Cervical Spine w/o Contrast    Impression    IMPRESSION:  HEAD CT:  1.  No acute intracranial abnormality.  2.  No calvarial or skull base fracture.    CERVICAL SPINE CT:  1.  No CT evidence for acute fracture or post traumatic subluxation.       RADIOLOGY:  CT Cervical Spine w/o Contrast   Final Result   IMPRESSION:   HEAD CT:   1.  No acute intracranial abnormality.   2.  No calvarial or skull base fracture.      CERVICAL SPINE CT:   1.  No CT evidence for acute fracture or post traumatic subluxation.      CT Head w/o Contrast   Final Result   IMPRESSION:   HEAD CT:   1.  No acute intracranial abnormality.   2.  No calvarial or skull base fracture.      CERVICAL SPINE CT:   1.  No CT evidence for acute fracture or post traumatic subluxation.             I, Elvira Colon, am serving as a scribe to document services personally performed by Marco Antonio Banda MD, based on my observation and the provider's statements to me. I, Marco Antonio Banda MD attest that Elvira Colon is acting in a scribe capacity, has observed my performance of the services and has documented them in accordance with my direction.    Marco Antonio Banda M.D.  Emergency Medicine  [facility] EMERGENCY DEPARTMENT  [address] [place] [id number]  [phone]  Dept: [phone]     Marco Antonio Banda MD  02/13/23 6974

## 2023-02-13 NOTE — ED TRIAGE NOTES
Patient arrives to triage with chief complaint of falling/slipping on the ice today and landing striking the back of his head.  Patient denies LOC, no thinners.  Patient has approximately 0.5 cm abrasion to the back of his head, bleeding is controlled.  Patient complaining of neck pain/tenderness upon palpation, C-collar applied.  Patient is alert and oriented x4.

## 2023-05-20 ENCOUNTER — HEALTH MAINTENANCE LETTER (OUTPATIENT)
Age: 63
End: 2023-05-20

## 2023-06-12 ENCOUNTER — LAB REQUISITION (OUTPATIENT)
Dept: LAB | Facility: CLINIC | Age: 63
End: 2023-06-12
Payer: COMMERCIAL

## 2023-06-12 LAB
GRAM STAIN RESULT: NORMAL
GRAM STAIN RESULT: NORMAL

## 2023-06-12 PROCEDURE — 87205 SMEAR GRAM STAIN: CPT | Mod: ORL | Performed by: ORTHOPAEDIC SURGERY

## 2023-06-12 PROCEDURE — 87070 CULTURE OTHR SPECIMN AEROBIC: CPT | Mod: ORL | Performed by: ORTHOPAEDIC SURGERY

## 2023-06-12 PROCEDURE — 87176 TISSUE HOMOGENIZATION CULTR: CPT | Mod: ORL | Performed by: ORTHOPAEDIC SURGERY

## 2023-06-14 ENCOUNTER — HOSPITAL ENCOUNTER (EMERGENCY)
Facility: HOSPITAL | Age: 63
Discharge: HOME | End: 2023-06-15
Payer: OTHER MISCELLANEOUS

## 2023-06-14 VITALS
DIASTOLIC BLOOD PRESSURE: 88 MMHG | HEART RATE: 75 BPM | RESPIRATION RATE: 18 BRPM | SYSTOLIC BLOOD PRESSURE: 139 MMHG | TEMPERATURE: 98 F | WEIGHT: 190 LBS | BODY MASS INDEX: 28.06 KG/M2 | OXYGEN SATURATION: 100 %

## 2023-06-14 DIAGNOSIS — R03.0 ELEVATED BLOOD PRESSURE READING WITHOUT DIAGNOSIS OF HYPERTENSION: ICD-10-CM

## 2023-06-14 LAB
ANION GAP SERPL CALCULATED.3IONS-SCNC: 9 MMOL/L (ref 7–15)
BUN SERPL-MCNC: 24.3 MG/DL (ref 8–23)
CALCIUM SERPL-MCNC: 9.7 MG/DL (ref 8.8–10.2)
CHLORIDE SERPL-SCNC: 104 MMOL/L (ref 98–107)
CREAT SERPL-MCNC: 1.01 MG/DL (ref 0.67–1.17)
DEPRECATED HCO3 PLAS-SCNC: 29 MMOL/L (ref 22–29)
ERYTHROCYTE [DISTWIDTH] IN BLOOD BY AUTOMATED COUNT: 12.8 % (ref 10–15)
GFR SERPL CREATININE-BSD FRML MDRD: 84 ML/MIN/1.73M2
GLUCOSE SERPL-MCNC: 171 MG/DL (ref 70–99)
HCT VFR BLD AUTO: 45.4 % (ref 40–53)
HGB BLD-MCNC: 15.4 G/DL (ref 13.3–17.7)
MCH RBC QN AUTO: 31.2 PG (ref 26.5–33)
MCHC RBC AUTO-ENTMCNC: 33.9 G/DL (ref 31.5–36.5)
MCV RBC AUTO: 92 FL (ref 78–100)
PLATELET # BLD AUTO: 263 10E3/UL (ref 150–450)
POTASSIUM SERPL-SCNC: 4.8 MMOL/L (ref 3.4–5.3)
RBC # BLD AUTO: 4.93 10E6/UL (ref 4.4–5.9)
SODIUM SERPL-SCNC: 142 MMOL/L (ref 136–145)
TROPONIN T SERPL HS-MCNC: <6 NG/L
WBC # BLD AUTO: 15.3 10E3/UL (ref 4–11)

## 2023-06-14 PROCEDURE — 36415 COLL VENOUS BLD VENIPUNCTURE: CPT

## 2023-06-14 PROCEDURE — 85014 HEMATOCRIT: CPT

## 2023-06-14 PROCEDURE — 99284 EMERGENCY DEPT VISIT MOD MDM: CPT

## 2023-06-14 PROCEDURE — 93005 ELECTROCARDIOGRAM TRACING: CPT

## 2023-06-14 PROCEDURE — 80048 BASIC METABOLIC PNL TOTAL CA: CPT

## 2023-06-14 PROCEDURE — 84484 ASSAY OF TROPONIN QUANT: CPT

## 2023-06-14 ASSESSMENT — ENCOUNTER SYMPTOMS
BACK PAIN: 0
CHILLS: 0
FEVER: 0
SHORTNESS OF BREATH: 0

## 2023-06-15 NOTE — ED PROVIDER NOTES
EMERGENCY DEPARTMENT ENCOUNTER      NAME: Scot Spaulding  AGE: 62 year old male  YOB: 1960  MRN: [id number]  EVALUATION DATE & TIME: No admission date for patient encounter.    PCP: Tj Juan    ED PROVIDER: Luz Porras PA-C    Chief Complaint   Patient presents with     Hypertension     FINAL IMPRESSION:  1. Elevated blood pressure reading without diagnosis of hypertension      MEDICAL DECISION MAKING:    Pertinent Labs & Imaging studies reviewed. (See chart for details)  Scot Spaulding is a 62 year old male who presents for evaluation of elevated blood pressure.  Patient reports had shoulder surgery on Monday (6/12/2023).  Had chest pain Monday evening that resolved.  Has been taking his blood pressure measurements at home due to the chest pain and noted it to be in the 200s systolic.  Presented to physical therapy where they checked his blood pressure and it was 196 systolic, encouraged him to be evaluated at the ER..  No underlying history of hypertension.  Currently denies chest pain, shortness of breath, headache, vision changes.  Reports prior to surgery, he had not been checking his blood pressure regularly at home.    On my initial evaluation, vital signs normal including a normal blood pressure at 139/88. On physical exam patient is awake, alert, no acute distress, resting in chair.  He is not uncomfortable, ill or toxic appearing..  Heart sounds are normal, lungs are clear without wheezes or crackles.  No chest wall tenderness on palpation.  His left shoulder is wrapped in a bandage, this was removed and revealed a very well-healing postsurgical shoulder with incision sites with surrounding ecchymosis.  No erythema, increased warmth or fluctuance to the left shoulder.  (Please see my images of his shoulder below)    Differential diagnosis includes ACS, PE, hypertension, pain, electrolyte abnormality, cellulitis, erysipelas, abscess, septic joint, osteomyelitis, pneumonia, pleural  effusion, pneumothorax. Emergency department workup included basic labs in addition to EKG and troponin.     Labs and images independently interpreted by me.  EKG without T wave inversion, ST elevation or ST depression, no chest pain, troponin 6, very low suspicion for ACS.  Blood pressures have been normal here.  He does have mild leukocytosis, however this could be reactive from recent shoulder surgery, he is not complaining of any fevers, chills, shortness of breath, cough, shoulder is well-appearing without erythema, fluctuance, increased warmth or any other signs of cellulitis, erysipelas, abscess or infection.  (Please see images below) I have very low suspicion for osteomyelitis or septic joint.  No recent cough, shortness of breath or fevers, low suspicion for pneumonia, pneumothorax, pleural effusion or PE.  Patient is vitally stable, did have recent surgery, however no other risk factors for PE.  Patient is currently symptom-free and his work-up is otherwise reassuring.  Suspect blood pressure could be elevated from pain, however unclear as his blood pressures have been normal here.  Encouraged him to continue taking a log of his blood pressures at home and to follow-up with PCP if has ongoing elevated blood pressure readings.    Otherwise reassuring exam and work-up today.  Low suspicion for any emergent process that require further ER intervention or hospital admission.  Provided expectant management as well as strict return precautions.    Patient has had serial examinations and notes significant improvement.     Patient was discharged in stable condition with treatment plan as below. Instructed to follow up with primary care provider in 3 days and return to the emergency department with any new or worsening of symptoms. Patient expressed understanding, feels comfortable, and is in agreement with this plan. All questions addressed prior to discharge.    Medical Decision  Making    History:    Supplemental history from: Documented in chart, if applicable    External Record(s) reviewed: Documented in chart, if applicable.    Work Up:    Chart documentation includes differential considered and any EKGs or imaging independently interpreted by provider, where specified.    In additional to work up documented, I considered the following work up: Documented in chart, if applicable.    External consultation:    Discussion of management with another provider: Documented in chart, if applicable    Complicating factors:    Care impacted by chronic illness: Diabetes and Hyperlipidemia    Care affected by social determinants of health: N/A    Disposition considerations: Discharge. No recommendations on prescription strength medication(s). N/A.    ED COURSE:  10:34 PM  I reviewed the patient's chart. I met with the patient to gather history and to perform my initial exam.    I wore appropriate PPE during this encounter including: facemask & eye protection   11:22 PM  I rechecked the patient and updated him on results. We discussed plan for discharge including treatment plan, follow-up and return precautions to emergency department.  Patient voiced understanding and in agreement with this plan.    At the conclusion of the encounter I discussed the results of all of the tests and the disposition. The questions were answered. The patient or family acknowledged understanding and was agreeable with the care plan.     MEDICATIONS GIVEN IN THE EMERGENCY:  Medications - No data to display    NEW PRESCRIPTIONS STARTED AT TODAY'S ER VISIT  Discharge Medication List as of 6/14/2023 11:41 PM               =================================================================    HPI:    Patient information was obtained from: patient    Use of Interpretor: N/A     Scot Spaulding is a 62 year old male with a pertinent history of hyperlipidemia, type 2 diabetes, and s/p shoulder surgery who presents to this ED via  walk in for evaluation of hypertension.     Patient reports that he had a shoulder injury 19 months ago and just had surgery last Monday 6/5/23. After the surgery he endorsed an episode of chest pain and states that his blood pressure was 254/150. He then checked it the next morning where it was 206/138. He went to physical therapy later that day and they measured it there which was 196/138. He was called today by physical therapist at 7:00 pm for recheck and it was 254/150 and was advised to go to the hospital. The patient notes that the shoulder pain radiated to the back of his head which has gone on for a month now. He took oxycodone for the pain earlier today. Denies shortness of breath, back pain, and vision changes. Otherwise at his usual state of health.     REVIEW OF SYSTEMS:  Review of Systems   Constitutional: Negative for chills and fever.   Eyes: Negative for visual disturbance.   Respiratory: Negative for shortness of breath.    Cardiovascular: Positive for chest pain (resolved).   Musculoskeletal: Negative for back pain.   All other systems reviewed and are negative.     PAST MEDICAL HISTORY:  Past Medical History:   Diagnosis Date     Diabetes (H)        PAST SURGICAL HISTORY:  History reviewed. No pertinent surgical history.    CURRENT MEDICATIONS:      Current Facility-Administered Medications:      lidocaine 1 % injection 2 mL, 2 mL, , , Jamel Cantu, DO, 2 mL at 02/22/22 1745     triamcinolone (KENALOG-40) injection 40 mg, 40 mg, , , Jamel Cantu, DO, 40 mg at 02/22/22 1745    Current Outpatient Medications:      atorvastatin (LIPITOR) 20 MG tablet, , Disp: , Rfl:      Continuous Blood Gluc Sensor (FREESTYLE HÉCTOR 14 DAY SENSOR) MISC, , Disp: , Rfl:      gabapentin (NEURONTIN) 100 MG capsule, , Disp: , Rfl:      glucose (BD GLUCOSE) 4 g chewable tablet, Chew 3-4 tablets only as needed in case of low blood sugar (<70 mg/dL), Disp: , Rfl:      LANTUS SOLOSTAR 100 UNIT/ML soln, ,  Disp: , Rfl:      oxyCODONE (ROXICODONE) 5 MG tablet, Take 1 tablet (5 mg) by mouth every 6 hours as needed for pain, Disp: 5 tablet, Rfl: 0     oxyCODONE (ROXICODONE) 5 MG tablet, Take 1 tablet (5 mg) by mouth every 8 hours as needed for severe pain, Disp: 10 tablet, Rfl: 0    ALLERGIES:  No Known Allergies    FAMILY HISTORY:  History reviewed. No pertinent family history.    SOCIAL HISTORY:   Social History     Socioeconomic History     Marital status: Single   Tobacco Use     Smoking status: Never     Smokeless tobacco: Never       VITALS:  Patient Vitals for the past 24 hrs:   BP Temp Temp src Pulse Resp SpO2 Weight   06/14/23 2219 139/88 -- -- 75 -- -- --   06/14/23 2205 (!) 160/93 -- -- 79 18 100 % --   06/14/23 2030 -- -- -- -- -- -- 86.2 kg (190 lb)   06/14/23 2029 (!) 144/84 98  F (36.7  C) Temporal 85 16 100 % --       PHYSICAL EXAM    Constitutional: Well developed, Well nourished, NAD  HENT: Normocephalic, Atraumatic, Bilateral external ears normal, Oropharynx normal, mucous membranes moist, Nose normal.   Neck: Normal range of motion, No tenderness, Supple, No stridor.  Eyes: PERRL, EOMI, Conjunctiva normal, No discharge.   Respiratory: Normal breath sounds, No respiratory distress, No wheezing, Speaks full sentences easily. No cough.  Cardiovascular: Normal heart rate, Regular rhythm, No murmurs, No rubs, No gallops. Chest wall nontender.  GI: Soft, No tenderness, No masses, No flank tenderness. No rebound or guarding.  Musculoskeletal: His left shoulder is wrapped in a bandage, this was removed and revealed a very well-healing postsurgical shoulder with incision sites with surrounding ecchymosis.  No erythema, increased warmth or fluctuance to the left shoulder.  (Please see my images of his shoulder below) 2+ DP pulses. No edema. No cyanosis, No clubbing. Good range of motion in all major joints. No tenderness to palpation or major deformities noted. No tenderness of the CTLS spine.   Integument:  Warm, Dry, No erythema, No rash. No petechiae.  Neurologic: Alert & oriented x 3, Normal motor function, Normal sensory function, No focal deficits noted. Normal gait.  Psychiatric: Affect normal, Judgment normal, Mood normal. Cooperative.    LAB:  All pertinent labs reviewed and interpreted.  Labs Ordered and Resulted from Time of ED Arrival to Time of ED Departure   CBC WITH PLATELETS - Abnormal       Result Value    WBC Count 15.3 (*)     RBC Count 4.93      Hemoglobin 15.4      Hematocrit 45.4      MCV 92      MCH 31.2      MCHC 33.9      RDW 12.8      Platelet Count 263     BASIC METABOLIC PANEL - Abnormal    Sodium 142      Potassium 4.8      Chloride 104      Carbon Dioxide (CO2) 29      Anion Gap 9      Urea Nitrogen 24.3 (*)     Creatinine 1.01      Calcium 9.7      Glucose 171 (*)     GFR Estimate 84     TROPONIN T, HIGH SENSITIVITY - Normal    Troponin T, High Sensitivity <6         RADIOLOGY:  Reviewed all pertinent imaging. Please see official radiology report.  No orders to display               EKG:    Performed at: 22:03  Rate: 69 bpm   Impression: Normal sinus rhythm, cannot rule out anterior infarct, age undetermined, abnormal ECG    I have reviewed and interpreted the EKG(s) documented above along with Dr. Escamilla.    Diagnosis:  1. Elevated blood pressure reading without diagnosis of hypertension      IFady, am serving as a scribe to document services personally performed by Luz Porras PA-C based on my observation and the provider's statements to me. ILuz PA-C attest that Fady Hong is acting in a scribe capacity, has observed my performance of the services and has documented them in accordance with my direction.    Luz Porras PA-C  Emergency Medicine  [facility]  6/14/2023       Luz Porras PA-C  06/15/23 0129

## 2023-06-15 NOTE — DISCHARGE INSTRUCTIONS
You were seen in the ER for concern for elevated blood pressure reading.  Your blood pressures were completely normal during today's visit.  Your blood test including markers for your heart were normal we do not think that this is a heart attack.  As we discussed your marker for infection was little elevated, however this could be due to recent surgery.  Your wounds look good and your shoulder and we do not think that your shoulder is not infected.  As we discussed, please continue to monitor your blood pressure at home and follow-up with your primary doctor if you continue to have elevated blood pressure readings.  Return to the ER if you develop worsening chest pain, shortness of breath, headaches, episodes of passing out, fevers, chills or any signs of infection to your shoulder wound.

## 2023-06-15 NOTE — ED TRIAGE NOTES
Patient had left shoulder surgery on Monday. He started having some chest pain on Monday night and thought it was related to the anesthesia. The chest pain  resolved by Tuesday and he went to physical therapy.  They took his blood pressure and said it was high and to call his primary. He has been taking his blood pressure at home with his home BP monitor that fits around your wrist. He reports his wrist cuff was 254/138.  He denies CP, SOB or headache.      Triage Assessment     Row Name 06/14/23 2023       Triage Assessment (Adult)    Airway WDL WDL       Respiratory WDL    Respiratory WDL WDL       Cardiac WDL    Cardiac WDL WDL       Peripheral/Neurovascular WDL    Peripheral Neurovascular WDL X  Recent shoulder       Cognitive/Neuro/Behavioral WDL    Cognitive/Neuro/Behavioral WDL WDL

## 2023-06-17 LAB — BACTERIA TISS BX CULT: NO GROWTH

## 2023-06-19 LAB
ATRIAL RATE - MUSE: 69 BPM
DIASTOLIC BLOOD PRESSURE - MUSE: NORMAL MMHG
INTERPRETATION ECG - MUSE: NORMAL
P AXIS - MUSE: 28 DEGREES
PR INTERVAL - MUSE: 152 MS
QRS DURATION - MUSE: 84 MS
QT - MUSE: 366 MS
QTC - MUSE: 392 MS
R AXIS - MUSE: 8 DEGREES
SYSTOLIC BLOOD PRESSURE - MUSE: NORMAL MMHG
T AXIS - MUSE: 51 DEGREES
VENTRICULAR RATE- MUSE: 69 BPM

## 2023-06-23 LAB — BACTERIA TISS BX CULT: ABNORMAL

## 2023-10-22 ENCOUNTER — HEALTH MAINTENANCE LETTER (OUTPATIENT)
Age: 63
End: 2023-10-22

## 2024-03-09 ENCOUNTER — HEALTH MAINTENANCE LETTER (OUTPATIENT)
Age: 64
End: 2024-03-09

## 2024-07-27 ENCOUNTER — HEALTH MAINTENANCE LETTER (OUTPATIENT)
Age: 64
End: 2024-07-27

## 2024-09-25 NOTE — LETTER
REPORT OF WORK ABILITY    NOTE TO EMPLOYEE: You must promptly provide a copy of this report to your  employer or worker's compensation insurer, and Qualified Rehabilitation Consultant.    Date: December 24, 2021                     Employee Name: Scot Spaulding         YOB: 1960  Medical Record Number: [id number]   Soc.Sec.No: [id number]  Employer: None                Date of Injury: 12/11/21  Managed Care Organization / Insurance Company Name: UNKNOWN    Diagnosis: left shoulder injury, strain; ongoing symptoms  Work Related: yes     MMI: NO  Permanent Partial Disability (PPD) likely: UNKNOWN    EMPLOYEE IS ABLE TO WORK: Return to work with restrictions on next scheduled work date. Restrictions in place until further notice; await additional imaging.     RESTRICTIONS IF ANY:  Continue with current restrictions.  Lift, carry:  Up to 5 lbs on left  Push/Pull:  Up to 5 lbs on left  Shoulder/Elbow:  left Avoid gripping/grasping, no outstretched arm, Avoid repetitive motion and No operating machine/vibrating tools  May use left hand/arm as a helper for the right side if comfortable.  Ladder/Stair climb:  Not at all (0 hours) for ladders  Reach Above Shoulders:  Not at all (0 hours) on left    OTHER RESTRICTIONS: None    TREATMENT PLAN/NOTES: change work position for comfort, best work height mid chest to mid thigh, may need to restrict work activities for comfort, continue medications as discussed, shoulder sling for comfort and cold pack for comfort and Rest as needed.  Plan MRI of the left shoulder next.            Jamel ROLDAN   [No, patient denies ideation or behavior] : No, patient denies ideation or behavior [Low acute suicide risk] : Low acute suicide risk

## 2024-12-13 ENCOUNTER — HOSPITAL ENCOUNTER (EMERGENCY)
Facility: HOSPITAL | Age: 64
Discharge: HOME | End: 2024-12-13
Attending: EMERGENCY MEDICINE | Admitting: EMERGENCY MEDICINE

## 2024-12-13 ENCOUNTER — APPOINTMENT (OUTPATIENT)
Dept: ULTRASOUND IMAGING | Facility: HOSPITAL | Age: 64
End: 2024-12-13
Attending: EMERGENCY MEDICINE

## 2024-12-13 VITALS
RESPIRATION RATE: 16 BRPM | SYSTOLIC BLOOD PRESSURE: 139 MMHG | OXYGEN SATURATION: 98 % | DIASTOLIC BLOOD PRESSURE: 90 MMHG | HEART RATE: 75 BPM | TEMPERATURE: 97.6 F

## 2024-12-13 DIAGNOSIS — M79.662 PAIN OF LEFT LOWER LEG: ICD-10-CM

## 2024-12-13 LAB
ANION GAP SERPL CALCULATED.3IONS-SCNC: 8 MMOL/L (ref 7–15)
BASOPHILS # BLD AUTO: 0.1 10E3/UL (ref 0–0.2)
BASOPHILS NFR BLD AUTO: 1 %
BUN SERPL-MCNC: 23.6 MG/DL (ref 8–23)
CALCIUM SERPL-MCNC: 9.4 MG/DL (ref 8.8–10.4)
CHLORIDE SERPL-SCNC: 103 MMOL/L (ref 98–107)
CK SERPL-CCNC: 195 U/L (ref 39–308)
CREAT SERPL-MCNC: 1.21 MG/DL (ref 0.67–1.17)
CRP SERPL-MCNC: 5.7 MG/L
EGFRCR SERPLBLD CKD-EPI 2021: 67 ML/MIN/1.73M2
EOSINOPHIL # BLD AUTO: 0.3 10E3/UL (ref 0–0.7)
EOSINOPHIL NFR BLD AUTO: 3 %
ERYTHROCYTE [DISTWIDTH] IN BLOOD BY AUTOMATED COUNT: 12.2 % (ref 10–15)
ERYTHROCYTE [SEDIMENTATION RATE] IN BLOOD BY WESTERGREN METHOD: 16 MM/HR (ref 0–20)
GLUCOSE BLDC GLUCOMTR-MCNC: 243 MG/DL (ref 70–99)
GLUCOSE SERPL-MCNC: 228 MG/DL (ref 70–99)
HCO3 SERPL-SCNC: 26 MMOL/L (ref 22–29)
HCT VFR BLD AUTO: 42.8 % (ref 40–53)
HGB BLD-MCNC: 15.1 G/DL (ref 13.3–17.7)
IMM GRANULOCYTES # BLD: 0.1 10E3/UL
IMM GRANULOCYTES NFR BLD: 1 %
LYMPHOCYTES # BLD AUTO: 3 10E3/UL (ref 0.8–5.3)
LYMPHOCYTES NFR BLD AUTO: 30 %
MCH RBC QN AUTO: 31.3 PG (ref 26.5–33)
MCHC RBC AUTO-ENTMCNC: 35.3 G/DL (ref 31.5–36.5)
MCV RBC AUTO: 89 FL (ref 78–100)
MONOCYTES # BLD AUTO: 1 10E3/UL (ref 0–1.3)
MONOCYTES NFR BLD AUTO: 10 %
NEUTROPHILS # BLD AUTO: 5.6 10E3/UL (ref 1.6–8.3)
NEUTROPHILS NFR BLD AUTO: 56 %
NRBC # BLD AUTO: 0 10E3/UL
NRBC BLD AUTO-RTO: 0 /100
PLATELET # BLD AUTO: 228 10E3/UL (ref 150–450)
POTASSIUM SERPL-SCNC: 5.1 MMOL/L (ref 3.4–5.3)
RBC # BLD AUTO: 4.83 10E6/UL (ref 4.4–5.9)
SODIUM SERPL-SCNC: 137 MMOL/L (ref 135–145)
WBC # BLD AUTO: 10.1 10E3/UL (ref 4–11)

## 2024-12-13 PROCEDURE — 85041 AUTOMATED RBC COUNT: CPT | Performed by: EMERGENCY MEDICINE

## 2024-12-13 PROCEDURE — 82374 ASSAY BLOOD CARBON DIOXIDE: CPT | Performed by: EMERGENCY MEDICINE

## 2024-12-13 PROCEDURE — 99285 EMERGENCY DEPT VISIT HI MDM: CPT | Mod: 25 | Performed by: EMERGENCY MEDICINE

## 2024-12-13 PROCEDURE — 96375 TX/PRO/DX INJ NEW DRUG ADDON: CPT | Performed by: EMERGENCY MEDICINE

## 2024-12-13 PROCEDURE — 96374 THER/PROPH/DIAG INJ IV PUSH: CPT | Performed by: EMERGENCY MEDICINE

## 2024-12-13 PROCEDURE — 93971 EXTREMITY STUDY: CPT | Mod: LT

## 2024-12-13 PROCEDURE — 82962 GLUCOSE BLOOD TEST: CPT

## 2024-12-13 PROCEDURE — 85652 RBC SED RATE AUTOMATED: CPT | Performed by: EMERGENCY MEDICINE

## 2024-12-13 PROCEDURE — 85004 AUTOMATED DIFF WBC COUNT: CPT | Performed by: EMERGENCY MEDICINE

## 2024-12-13 PROCEDURE — 80048 BASIC METABOLIC PNL TOTAL CA: CPT | Performed by: EMERGENCY MEDICINE

## 2024-12-13 PROCEDURE — 250N000011 HC RX IP 250 OP 636: Performed by: EMERGENCY MEDICINE

## 2024-12-13 PROCEDURE — 86140 C-REACTIVE PROTEIN: CPT | Performed by: EMERGENCY MEDICINE

## 2024-12-13 PROCEDURE — 250N000013 HC RX MED GY IP 250 OP 250 PS 637: Performed by: EMERGENCY MEDICINE

## 2024-12-13 PROCEDURE — 82550 ASSAY OF CK (CPK): CPT | Performed by: EMERGENCY MEDICINE

## 2024-12-13 PROCEDURE — 36415 COLL VENOUS BLD VENIPUNCTURE: CPT | Performed by: EMERGENCY MEDICINE

## 2024-12-13 RX ORDER — KETOROLAC TROMETHAMINE 15 MG/ML
15 INJECTION, SOLUTION INTRAMUSCULAR; INTRAVENOUS ONCE
Status: COMPLETED | OUTPATIENT
Start: 2024-12-13 | End: 2024-12-13

## 2024-12-13 RX ORDER — ACETAMINOPHEN 325 MG/1
650 TABLET ORAL ONCE
Status: COMPLETED | OUTPATIENT
Start: 2024-12-13 | End: 2024-12-13

## 2024-12-13 RX ORDER — OXYCODONE AND ACETAMINOPHEN 5; 325 MG/1; MG/1
1 TABLET ORAL EVERY 6 HOURS PRN
Qty: 12 TABLET | Refills: 0 | Status: SHIPPED | OUTPATIENT
Start: 2024-12-13 | End: 2024-12-16

## 2024-12-13 RX ORDER — ONDANSETRON 2 MG/ML
4 INJECTION INTRAMUSCULAR; INTRAVENOUS ONCE
Status: COMPLETED | OUTPATIENT
Start: 2024-12-13 | End: 2024-12-13

## 2024-12-13 RX ADMIN — ONDANSETRON 4 MG: 2 INJECTION INTRAMUSCULAR; INTRAVENOUS at 21:20

## 2024-12-13 RX ADMIN — ACETAMINOPHEN 650 MG: 325 TABLET ORAL at 22:51

## 2024-12-13 RX ADMIN — KETOROLAC TROMETHAMINE 15 MG: 15 INJECTION, SOLUTION INTRAMUSCULAR; INTRAVENOUS at 21:20

## 2024-12-13 ASSESSMENT — ACTIVITIES OF DAILY LIVING (ADL)
ADLS_ACUITY_SCORE: 41

## 2024-12-13 ASSESSMENT — COLUMBIA-SUICIDE SEVERITY RATING SCALE - C-SSRS
1. IN THE PAST MONTH, HAVE YOU WISHED YOU WERE DEAD OR WISHED YOU COULD GO TO SLEEP AND NOT WAKE UP?: NO
6. HAVE YOU EVER DONE ANYTHING, STARTED TO DO ANYTHING, OR PREPARED TO DO ANYTHING TO END YOUR LIFE?: NO
2. HAVE YOU ACTUALLY HAD ANY THOUGHTS OF KILLING YOURSELF IN THE PAST MONTH?: NO

## 2024-12-14 ENCOUNTER — HEALTH MAINTENANCE LETTER (OUTPATIENT)
Age: 64
End: 2024-12-14

## 2024-12-14 NOTE — ED TRIAGE NOTES
The patient reports pain in his left foot radiating up his leg for 2 weeks. He also reports swelling in the left lower leg. He reports the pain has reached his hip so he come to the er. He denies any recent trauma. Hx of diabetes and has had trouble controlling his BG levels.

## 2024-12-14 NOTE — ED NOTES
Discharge paperwork reviewed with the pt. Questions asked and answered. Pt waiting for his paperwork that was left in the US room when he went to go get his scan completed.

## 2024-12-14 NOTE — ED PROVIDER NOTES
EMERGENCY DEPARTMENT ENCOUNTER      NAME: Scot Spaulding  AGE: 64 year old male  YOB: 1960  MRN: [id number]  EVALUATION DATE & TIME: 2024  7:24 PM    PCP: No Ref-Primary, Physician    ED PROVIDER: Jude Montenegro M.D.      Chief Complaint   Patient presents with    Leg Swelling         FINAL IMPRESSION:      1.  Left leg pain and swelling.      ED COURSE & MEDICAL DECISION MAKIN. I met with the patient to gather history and to perform my initial exam. We discussed plans for the ED course, including diagnostic testing and treatment. PPE worn: cloth mask.  Notes 2 weeks of left foot pain and left leg pain and swelling.  .  Leg ultrasound negative for DVT.  CBC negative.  Chemistries with mild elevation to be in creatinine 24 and 1.21 and glucose 228 but normal anion gap.  CK negative.  Sed rate negative.  C-reactive protein mildly elevated 5.7.  Results communicated to the patient.  Patient will be discharged to home.  Will write a prescription for pain medication and ibuprofen.  He will follow-up with his doctor in the next week to assess progress.  He is in agreement with the plan.    Pertinent Labs & Imaging studies reviewed. (See chart for details)  64 year old male presents to the Emergency Department for evaluation of left leg pain and swelling.    At the conclusion of the encounter I discussed the results of all of the tests and the disposition. The questions were answered. The patient or family acknowledged understanding and was agreeable with the care plan.              Medical Decision Making  Obtained supplemental history:Supplemental history obtained?: No  Reviewed external records: Both inpatient and outpatient computer records were reviewed.  Care impacted by chronic illness: Chronic low back pain, GERD, diabetes.  Did you consider but not order tests?: Work up considered but not performed and documented in chart, if applicable  Did you interpret images independently?:  Independent interpretation of ECG and images noted in documentation, when applicable.  Consultation discussion with other provider: If admitted, will discuss with the admitting service.  Evaluation pending.  Depending on results, possible admission.    MIPS: Not Applicable        MEDICATIONS GIVEN IN THE EMERGENCY:  Medications - No data to display    NEW PRESCRIPTIONS STARTED AT TODAY'S ER VISIT  New Prescriptions    No medications on file          =================================================================    HPI    Patient information was obtained from: The patient.    Use of : N/A         Scot Spaulding is a 64 year old male with a pertinent history of chronic low back pain, GERD and diabetes who presents to this ED today for evaluation of left leg pain and swelling.  Symptoms present for at least 2 weeks.    He does not identify any waxing or waning symptoms otherwise, exacerbating or alleviating features, associated symptoms except as mentioned. He otherwise denies any pain related complaints.    REVIEW OF SYSTEMS   Review of Systems patient complaining of left leg pain and swelling.  No other complaints.  Specifically no back pain, mopped assist, chest pain or shortness of breath.    PAST MEDICAL HISTORY:  Past Medical History:   Diagnosis Date    Diabetes (H)        PAST SURGICAL HISTORY:  No past surgical history on file.        CURRENT MEDICATIONS:    atorvastatin (LIPITOR) 20 MG tablet  Continuous Blood Gluc Sensor (FREESTYLE HÉCTOR 14 DAY SENSOR) MISC  gabapentin (NEURONTIN) 100 MG capsule  glucose (BD GLUCOSE) 4 g chewable tablet  LANTUS SOLOSTAR 100 UNIT/ML soln  oxyCODONE (ROXICODONE) 5 MG tablet  oxyCODONE (ROXICODONE) 5 MG tablet        ALLERGIES:  No Known Allergies    FAMILY HISTORY:  No family history on file.    SOCIAL HISTORY:   Social History     Socioeconomic History    Marital status: Single   Tobacco Use    Smoking status: Never    Smokeless tobacco: Never     Social Drivers  [facility]      Received from [facility] [address]    Social Connections     No drugs, alcohol, or tobacco.    VITALS:  BP (!) 163/86   Pulse 107   Temp 97.6  F (36.4  C) (Oral)   Resp 16   SpO2 96%     PHYSICAL EXAM    Vital Signs:  BP (!) 163/86   Pulse 107   Temp 97.6  F (36.4  C) (Oral)   Resp 16   SpO2 96%   General:  On entering the room he is in no apparent distress.    Neck:  Neck supple with full range of motion and nontender.    Back:  Back and spine are nontender.  No costovertebral angle tenderness.    HEENT:  Oropharynx clear with moist mucous membranes.  HEENT unremarkable.    Pulmonary:  Chest clear to auscultation without rhonchi rales or wheezing.    Cardiovascular:  Cardiac regular rate and rhythm without murmurs rubs or gallops.    Abdomen:  Abdomen soft nontender.  There is no rebound or guarding.    Muskuloskeletal:  He moves all 4 without any difficulty and has normal neurovascular exams.  Extremities without clubbing, cyanosis, or edema.  Legs and calves are nontender on the right.  Patient tender in the left leg with swelling in the calf and thigh.  Good pulse capillary fill.  Sensation intact soft touch.  Neuro:  He is alert and oriented ×3 and moves all extremities symmetrically.    Psych:  Normal affect.    Skin:  Unremarkable and warm and dry.       LAB:  All pertinent labs reviewed and interpreted.  Labs Ordered and Resulted from Time of ED Arrival to Time of ED Departure   GLUCOSE BY METER - Abnormal       Result Value    GLUCOSE BY METER POCT 243 (*)    BASIC METABOLIC PANEL - Abnormal    Sodium 137      Potassium 5.1      Chloride 103      Carbon Dioxide (CO2) 26      Anion Gap 8      Urea Nitrogen 23.6 (*)     Creatinine 1.21 (*)     GFR Estimate 67      Calcium 9.4      Glucose 228 (*)    CRP INFLAMMATION - Abnormal    CRP Inflammation 5.70 (*)    ERYTHROCYTE SEDIMENTATION RATE AUTO - Normal    Erythrocyte Sedimentation Rate 16     CK TOTAL - Normal          CBC WITH PLATELETS AND DIFFERENTIAL    WBC Count 10.1      RBC Count 4.83      Hemoglobin 15.1      Hematocrit 42.8      MCV 89      MCH 31.3      MCHC 35.3      RDW 12.2      Platelet Count 228      % Neutrophils 56      % Lymphocytes 30      % Monocytes 10      % Eosinophils 3      % Basophils 1      % Immature Granulocytes 1      NRBCs per 100 WBC 0      Absolute Neutrophils 5.6      Absolute Lymphocytes 3.0      Absolute Monocytes 1.0      Absolute Eosinophils 0.3      Absolute Basophils 0.1      Absolute Immature Granulocytes 0.1      Absolute NRBCs 0.0         RADIOLOGY:  Reviewed all pertinent imaging. Please see official radiology report.  US Lower Extremity Venous Duplex Left   Final Result   IMPRESSION:   No deep venous thrombosis in the left lower extremity.                 EKG:          PROCEDURES:         I, Lakeisha Bryant, am serving as a scribe to document services personally performed by Dr. Montenegro based on my observation and the provider's statements to me. I, Jude Montenegro MD attest that Lakeisha Bryant is acting in a scribe capacity, has observed my performance of the services and has documented them in accordance with my direction.    Jude Montenegro M.D.  Emergency Medicine  [facility] EMERGENCY DEPARTMENT  [address] [facility] [address]  [phone]  Dept: [phone]      Jude Montenegro MD  12/13/24 [id number]

## 2024-12-14 NOTE — DISCHARGE INSTRUCTIONS
Elevate leg whenever possible to decrease swelling.  Ice off-and-on for the few days can help with pain and decrease swelling.  Over-the-counter Tylenol or ibuprofen every 6 hours as needed and as tolerated for pain.  1 Percocet every 6 hours if needed for stronger pain.  Do not drive with this.  See your clinic for follow-up in the next week to assess progress and/or need for further evaluation.

## 2024-12-16 ENCOUNTER — APPOINTMENT (OUTPATIENT)
Dept: ADMINISTRATIVE | Facility: CLINIC | Age: 64
End: 2024-12-16

## 2025-03-02 ENCOUNTER — HOSPITAL ENCOUNTER (EMERGENCY)
Facility: HOSPITAL | Age: 65
Discharge: HOME | End: 2025-03-02
Payer: COMMERCIAL

## 2025-03-02 VITALS
RESPIRATION RATE: 17 BRPM | OXYGEN SATURATION: 96 % | DIASTOLIC BLOOD PRESSURE: 75 MMHG | HEIGHT: 69 IN | TEMPERATURE: 100.1 F | WEIGHT: 280 LBS | SYSTOLIC BLOOD PRESSURE: 128 MMHG | HEART RATE: 90 BPM | BODY MASS INDEX: 41.47 KG/M2

## 2025-03-02 DIAGNOSIS — N39.0 UTI (URINARY TRACT INFECTION), UNCOMPLICATED: ICD-10-CM

## 2025-03-02 DIAGNOSIS — R73.9 HYPERGLYCEMIA: ICD-10-CM

## 2025-03-02 LAB
ALBUMIN UR-MCNC: 30 MG/DL
ANION GAP SERPL CALCULATED.3IONS-SCNC: 11 MMOL/L (ref 7–15)
APPEARANCE UR: CLEAR
B-OH-BUTYR SERPL-SCNC: 0.44 MMOL/L
BASE EXCESS BLDV CALC-SCNC: 0.8 MMOL/L (ref -3–3)
BASOPHILS # BLD AUTO: 0 10E3/UL (ref 0–0.2)
BASOPHILS NFR BLD AUTO: 0 %
BILIRUB UR QL STRIP: NEGATIVE
BUN SERPL-MCNC: 18.6 MG/DL (ref 8–23)
CALCIUM SERPL-MCNC: 8.9 MG/DL (ref 8.8–10.4)
CHLORIDE SERPL-SCNC: 97 MMOL/L (ref 98–107)
COLOR UR AUTO: ABNORMAL
CREAT SERPL-MCNC: 1.06 MG/DL (ref 0.67–1.17)
EGFRCR SERPLBLD CKD-EPI 2021: 78 ML/MIN/1.73M2
EOSINOPHIL # BLD AUTO: 0.1 10E3/UL (ref 0–0.7)
EOSINOPHIL NFR BLD AUTO: 1 %
ERYTHROCYTE [DISTWIDTH] IN BLOOD BY AUTOMATED COUNT: 12.3 % (ref 10–15)
GLUCOSE BLDC GLUCOMTR-MCNC: 287 MG/DL (ref 70–99)
GLUCOSE SERPL-MCNC: 437 MG/DL (ref 70–99)
GLUCOSE UR STRIP-MCNC: >1000 MG/DL
HCO3 BLDV-SCNC: 27 MMOL/L (ref 21–28)
HCO3 SERPL-SCNC: 25 MMOL/L (ref 22–29)
HCT VFR BLD AUTO: 44 % (ref 40–53)
HGB BLD-MCNC: 15.1 G/DL (ref 13.3–17.7)
HGB UR QL STRIP: ABNORMAL
HOLD SPECIMEN: NORMAL
IMM GRANULOCYTES # BLD: 0.3 10E3/UL
IMM GRANULOCYTES NFR BLD: 3 %
KETONES UR STRIP-MCNC: ABNORMAL MG/DL
LACTATE SERPL-SCNC: 1.4 MMOL/L (ref 0.7–2)
LEUKOCYTE ESTERASE UR QL STRIP: NEGATIVE
LYMPHOCYTES # BLD AUTO: 0.7 10E3/UL (ref 0.8–5.3)
LYMPHOCYTES NFR BLD AUTO: 7 %
MCH RBC QN AUTO: 31.1 PG (ref 26.5–33)
MCHC RBC AUTO-ENTMCNC: 34.3 G/DL (ref 31.5–36.5)
MCV RBC AUTO: 91 FL (ref 78–100)
MONOCYTES # BLD AUTO: 0.9 10E3/UL (ref 0–1.3)
MONOCYTES NFR BLD AUTO: 8 %
NEUTROPHILS # BLD AUTO: 8.6 10E3/UL (ref 1.6–8.3)
NEUTROPHILS NFR BLD AUTO: 82 %
NITRATE UR QL: NEGATIVE
NRBC # BLD AUTO: 0 10E3/UL
NRBC BLD AUTO-RTO: 0 /100
O2/TOTAL GAS SETTING VFR VENT: 21 %
OXYHGB MFR BLDV: 33 % (ref 70–75)
PCO2 BLDV: 46 MM HG (ref 40–50)
PH BLDV: 7.37 [PH] (ref 7.32–7.43)
PH UR STRIP: 5.5 [PH] (ref 5–7)
PLAT MORPH BLD: NORMAL
PLATELET # BLD AUTO: 223 10E3/UL (ref 150–450)
PO2 BLDV: 22 MM HG (ref 25–47)
POTASSIUM SERPL-SCNC: 4.9 MMOL/L (ref 3.4–5.3)
PROCALCITONIN SERPL IA-MCNC: 0.3 NG/ML
RBC # BLD AUTO: 4.86 10E6/UL (ref 4.4–5.9)
RBC MORPH BLD: NORMAL
RBC URINE: 1 /HPF
SAO2 % BLDV: 33.6 % (ref 70–75)
SODIUM SERPL-SCNC: 133 MMOL/L (ref 135–145)
SP GR UR STRIP: 1.03 (ref 1–1.03)
UROBILINOGEN UR STRIP-MCNC: <2 MG/DL
WBC # BLD AUTO: 10.5 10E3/UL (ref 4–11)
WBC URINE: 3 /HPF

## 2025-03-02 PROCEDURE — 36415 COLL VENOUS BLD VENIPUNCTURE: CPT

## 2025-03-02 PROCEDURE — 83605 ASSAY OF LACTIC ACID: CPT

## 2025-03-02 PROCEDURE — 84145 PROCALCITONIN (PCT): CPT

## 2025-03-02 PROCEDURE — 82010 KETONE BODYS QUAN: CPT

## 2025-03-02 PROCEDURE — 258N000003 HC RX IP 258 OP 636

## 2025-03-02 PROCEDURE — 93005 ELECTROCARDIOGRAM TRACING: CPT

## 2025-03-02 PROCEDURE — 82374 ASSAY BLOOD CARBON DIOXIDE: CPT

## 2025-03-02 PROCEDURE — 85025 COMPLETE CBC W/AUTO DIFF WBC: CPT

## 2025-03-02 PROCEDURE — 82805 BLOOD GASES W/O2 SATURATION: CPT

## 2025-03-02 PROCEDURE — 81001 URINALYSIS AUTO W/SCOPE: CPT

## 2025-03-02 PROCEDURE — 82962 GLUCOSE BLOOD TEST: CPT

## 2025-03-02 PROCEDURE — 80048 BASIC METABOLIC PNL TOTAL CA: CPT

## 2025-03-02 PROCEDURE — 96360 HYDRATION IV INFUSION INIT: CPT

## 2025-03-02 PROCEDURE — 99284 EMERGENCY DEPT VISIT MOD MDM: CPT | Mod: 25

## 2025-03-02 PROCEDURE — 96361 HYDRATE IV INFUSION ADD-ON: CPT

## 2025-03-02 RX ORDER — ROSUVASTATIN CALCIUM 5 MG/1
5 TABLET, COATED ORAL DAILY
COMMUNITY

## 2025-03-02 RX ORDER — IRBESARTAN 150 MG/1
150 TABLET ORAL DAILY
COMMUNITY

## 2025-03-02 RX ORDER — POLYETHYLENE GLYCOL 3350 17 G/17G
1 POWDER, FOR SOLUTION ORAL DAILY
Qty: 510 G | Refills: 0 | Status: SHIPPED | OUTPATIENT
Start: 2025-03-02 | End: 2025-04-01

## 2025-03-02 RX ORDER — CYCLOBENZAPRINE HCL 5 MG
5-10 TABLET ORAL 3 TIMES DAILY PRN
COMMUNITY
Start: 2025-02-06

## 2025-03-02 RX ORDER — METFORMIN HYDROCHLORIDE 500 MG/1
2000 TABLET, EXTENDED RELEASE ORAL
COMMUNITY

## 2025-03-02 RX ORDER — CEFUROXIME AXETIL 500 MG/1
500 TABLET ORAL 2 TIMES DAILY
COMMUNITY
Start: 2025-02-27 | End: 2025-03-06

## 2025-03-02 RX ADMIN — SODIUM CHLORIDE 1000 ML: 9 INJECTION, SOLUTION INTRAVENOUS at 10:16

## 2025-03-02 RX ADMIN — SODIUM CHLORIDE 1000 ML: 0.9 INJECTION, SOLUTION INTRAVENOUS at 11:23

## 2025-03-02 ASSESSMENT — ACTIVITIES OF DAILY LIVING (ADL)
ADLS_ACUITY_SCORE: 41

## 2025-03-02 ASSESSMENT — COLUMBIA-SUICIDE SEVERITY RATING SCALE - C-SSRS
1. IN THE PAST MONTH, HAVE YOU WISHED YOU WERE DEAD OR WISHED YOU COULD GO TO SLEEP AND NOT WAKE UP?: NO
2. HAVE YOU ACTUALLY HAD ANY THOUGHTS OF KILLING YOURSELF IN THE PAST MONTH?: NO
6. HAVE YOU EVER DONE ANYTHING, STARTED TO DO ANYTHING, OR PREPARED TO DO ANYTHING TO END YOUR LIFE?: NO

## 2025-03-02 NOTE — DISCHARGE INSTRUCTIONS
You have been evaluated in the Emergency Department today for your urinary symptoms. Your evaluation, including urinalysis, suggests that your symptoms are due to a urinary tract infection. Please take your prescribed antibiotics for the full course of the medication as directed.    Please follow up with your primary care physician as directed.    Return to the Emergency Department if you experience fevers 100.4  or greater, worsening or uncontrolled pain, vomiting, flank (mid back) pain, or for any other concerning symptoms.    Thank you for choosing us for your care.      Glucose was elevated however he did not have evidence of DKA.  Please take your insulin as prescribed and continue to be hydrated.

## 2025-03-02 NOTE — ED NOTES
Bed: JNED-04  Expected date:   Expected time:   Means of arrival:   Comments:  Tiffany-64 male hyperglycemia

## 2025-03-02 NOTE — ED PROVIDER NOTES
EMERGENCY DEPARTMENT ENCOUNTER      NAME: Scot Spaulding  AGE: 64 year old male  YOB: 1960  MRN: [id number]  EVALUATION DATE & TIME: 3/2/2025  9:38 AM    PCP: No Ref-Primary, Physician    ED PROVIDER: Yair Tucker MD    FINAL IMPRESSION:  1. UTI (urinary tract infection), uncomplicated    2. Hyperglycemia        ED COURSE & MEDICAL DECISION MAKING:    Pertinent Labs & Imaging studies reviewed. (See chart for details)  64 year old male presents to the Emergency Department for evaluation of UTI.  Differential diagnosis considered Sepsis, intra-abdominal infection, acute otitis media, encephalitis, endocarditis, meningitis, pulm embolism, pneumonia, prostatitis, skin infection, pharyngitis, viral URI, urinary tract infection, pyelonephritis, CNS lesion, sympathomimetic intoxication, heatstroke, neuroleptic malignant syndrome, serotonin syndrome, malignant hyperthermia, thyroid storm.     Triage Note: Patient arrives with [facility] from home, was seen in clinic last week for UTI found to have high blood sugar in 600's- he was told to come to ED but he refused. Patient has increasing UTI symptoms and blood sugars still uncontrolled      ED Course as of 03/05/25 0817   Sun Mar 02, 2025   0938 I patient and performed my initial exam. Over the past 4 days patient has been feeling systemically unwell.  Went to outside urgent care on 2/27 and was told he had pyelonephritis and early DKA with a blood sugar of 600.  He was given IV ceftriaxone and Ceftin in the outpatient setting however has had nausea vomiting and has not tolerated his p.o. antibiotic.  He is progressively felt more unwell.  He is having increased urinary frequency and dysuria.  He arrives tachycardic but afebrile.  Abdomen is soft and nontender and no CVA tenderness.  He does have some mild tachypnea and I have concern for possible DKA.  Blood sugar was in the 300s with EMS.  Received Zofran from EMS.  Will give IV fluids and perform sepsis  and DKA workup.  Suspect patient will need admission for failure of outpatient antibiotics and control of his blood sugars.    Of note on Monday patient had a joint injection of steroids into his left knee.  His left knee has full range of motion, no pain swelling or erythema.  Lower concern for septic arthritis.   1004 Blood gas venous(!)   no acidosis   1004 Lactic acid whole blood with 1x repeat in 2 hr when >2  Lactic is not elevated, doubt septic shock or severe sepsis   1109 Reviewed outside urine culture which was greater than 100,000 E. coli that was pansensitive.  Previously been tolerating cefuroxime.   1344 The patient at bedside.  He is not vomiting.  He feels better after IV fluids.  Patient's glucose is dramatically improved with IV fluids to 287.  He had specific gravity that was elevated likely dehydrated.  After rehydration he feels better.  More well-appearing.  Offered admission versus discharge and a preferred to be discharged.  I believe this is reasonable.  Do not believe he is had failure of outpatient antibiotics given he is not been vomiting.  Will have him continue to take his cefuroxime.  He is also stating that he is having some constipation.  His abdomen is nontender or distended and I doubt a bowel obstruction.  Will give MiraLAX.  Will him continue to take his outpatient antibiotics.  He has enough insulin at home and encouraged to take his insulin as prescribed.  We present emerged part if any new or worsening symptoms.   1406 Patient's temperature 100.1.  Elevated temperature just prior to discharge.  Again offered admission for IV antibiotics given that he is a fever UTI and concern for pyelonephritis however patient still declines.       Not Applicable    I discussed the plan for discharge with the patient and patient is agreeable. We discussed supportive cares at home and reasons to return to the ER including new or worsening symptoms. All questions and concerns addressed to the  best of my ability. Strict return precautions discussed. Patient to be discharge by RN.    At the conclusion of the encounter I discussed the results of the tests and the disposition. The questions were answered. The patient or family acknowledged understanding and was agreeable with the care plan.     MEDICATIONS GIVEN IN THE EMERGENCY:  Medications   sodium chloride 0.9% BOLUS 1,000 mL (0 mLs Intravenous Stopped 3/2/25 1124)   sodium chloride 0.9% BOLUS 1,000 mL (0 mLs Intravenous Stopped 3/2/25 1226)       NEW PRESCRIPTIONS STARTED AT TODAY'S ER VISIT  Discharge Medication List as of 3/2/2025  1:56 PM        START taking these medications    Details   polyethylene glycol (MIRALAX) 17 GM/Dose powder Take 17 g (1 Capful) by mouth daily., Disp-510 g, R-0, E-Prescribe           Discharge Medication List as of 3/2/2025  1:56 PM          =================================================================    HPI    Scot Spaulding is a 64 year old male with a pertinent history of Diabetes, Hyperlipemia, and GERD who presents to this ED for evaluation of hyperglycemia and UIT.     Per chart review, patient was seen at [facility] in [place] on 02/24/25 for advanced degenerative arthritis of left knee. Patient had a cortisone type injection of the left knee along with an arthrocentesis. 31 mL of fluid was aspirated out of the left knee.      Per chart review, patient was seen at [facility] [place] on 02/27/25 for abdominal cramping, fevers, shortness of breath, headaches, cosntipation and generalized muscle aches. Patient was diagnosed with 1. Pyelonephritis N12 cefuroxime axetil (CEFTIN) 500 mg tablet  2. Hyperglycemia R73.9 and 3. Diabetic ketoacidosis without coma associated with type 1 diabetes mellitus (HC) E10.10. patient was recommended to go to the ED, but he declined.       Per EMS, patient comes from home and was diagnosed with UTI on Thursday ( 02/27/25) at the  "Urgent Care. Patient also had high blood sugar and was told to come to the ED. However, he did not go to the ED due to not having anyone to watch his dog. Patient went home instead and has been trying to manage and lower his blood sugar at home. Today, he endorses nausea but no vomiting.  Has been able to tolerate his antibiotics.  Has not taken them today.  Trace urinary frequency.    Per the patient, he endorses dysuria, high blood pressure and nausea. Patient also reports feeling \"shaky\" and has difficulty remembering things. He started taking the antibiotic prescribed to him yesterday, but did not take any today. Patient had an arthrocentesis done on Monday (02/24/25) and started feeling sick Thursday (02/27/25). [facility] day and was told to come into the ED. However, patient declined. Patient currently endorses nausea, but denies any vomiting. No other complaints at this time.     Use of : N/A    PHYSICAL EXAM    /75   Pulse 90   Temp 100.1  F (37.8  C) (Oral)   Resp 17   Ht 1.753 m (5' 9\")   Wt 127 kg (280 lb)   SpO2 96%   BMI 41.35 kg/m      Constitutional: Comfortable appearing.  Head: Normocephalic, atraumatic, mucous membranes moist, nose normal.   Neck: Supple, gross ROM intact.   Eyes: Pupils mid-range, sclera white.  Respiratory: Clear to auscultation bilaterally, no respiratory distress, no wheezing, speaks full sentences easily, lungs clear.   Cardiovascular: Mild tachycardiac, regular rhythm, no murmurs. No lower extremity edema.   GI: Soft, no tenderness to deep palpation in all quadrants.  Musculoskeletal: Moving all 4 extremities intentionally and without pain. No obvious deformity. Full range of motion and strength to the left knee.   Skin: Warm, dry, no rash. No swelling or redness to the left knee.   Neurologic: Alert & oriented x 3, speech clear, moving all extremities spontaneously   Psychiatric: Affect normal, cooperative.      LAB:  All " pertinent labs reviewed and interpreted.  Results for orders placed or performed during the hospital encounter of 03/02/25   Basic metabolic panel   Result Value Ref Range    Sodium 133 (L) 135 - 145 mmol/L    Potassium 4.9 3.4 - 5.3 mmol/L    Chloride 97 (L) 98 - 107 mmol/L    Carbon Dioxide (CO2) 25 22 - 29 mmol/L    Anion Gap 11 7 - 15 mmol/L    Urea Nitrogen 18.6 8.0 - 23.0 mg/dL    Creatinine 1.06 0.67 - 1.17 mg/dL    GFR Estimate 78 >60 mL/min/1.73m2    Calcium 8.9 8.8 - 10.4 mg/dL    Glucose 437 (H) 70 - 99 mg/dL   Lactic acid whole blood with 1x repeat in 2 hr when >2   Result Value Ref Range    Lactic Acid, Initial 1.4 0.7 - 2.0 mmol/L   Result Value Ref Range    Procalcitonin 0.30 <0.50 ng/mL   Blood gas venous   Result Value Ref Range    pH Venous 7.37 7.32 - 7.43    pCO2 Venous 46 40 - 50 mm Hg    pO2 Venous 22 (L) 25 - 47 mm Hg    Bicarbonate Venous 27 21 - 28 mmol/L    Base Excess/Deficit Venous 0.8 -3.0 - 3.0 mmol/L    FIO2 21     Oxyhemoglobin Venous 33 (L) 70 - 75 %    O2 Sat, Venous 33.6 (L) 70.0 - 75.0 %   UA with Microscopic reflex to Culture    Specimen: Urine, Midstream   Result Value Ref Range    Color Urine Light Yellow Colorless, Straw, Light Yellow, Yellow    Appearance Urine Clear Clear    Glucose Urine >1000 (A) Negative mg/dL    Bilirubin Urine Negative Negative    Ketones Urine Trace (A) Negative mg/dL    Specific Gravity Urine 1.033 (H) 1.001 - 1.030    Blood Urine 0.06 mg/dL (A) Negative    pH Urine 5.5 5.0 - 7.0    Protein Albumin Urine 30 (A) Negative mg/dL    Urobilinogen Urine <2.0 <2.0 mg/dL    Nitrite Urine Negative Negative    Leukocyte Esterase Urine Negative Negative    RBC Urine 1 <=2 /HPF    WBC Urine 3 <=5 /HPF   Ketone Beta-Hydroxybutyrate Quantitative   Result Value Ref Range    Ketone (Beta-Hydroxybutyrate) Quantitative 0.44 (H) <=0.30 mmol/L   CBC with platelets and differential   Result Value Ref Range    WBC Count 10.5 4.0 - 11.0 10e3/uL    RBC Count 4.86 4.40 - 5.90  10e6/uL    Hemoglobin 15.1 13.3 - 17.7 g/dL    Hematocrit 44.0 40.0 - 53.0 %    MCV 91 78 - 100 fL    MCH 31.1 26.5 - 33.0 pg    MCHC 34.3 31.5 - 36.5 g/dL    RDW 12.3 10.0 - 15.0 %    Platelet Count 223 150 - 450 10e3/uL    % Neutrophils 82 %    % Lymphocytes 7 %    % Monocytes 8 %    % Eosinophils 1 %    % Basophils 0 %    % Immature Granulocytes 3 %    NRBCs per 100 WBC 0 <1 /100    Absolute Neutrophils 8.6 (H) 1.6 - 8.3 10e3/uL    Absolute Lymphocytes 0.7 (L) 0.8 - 5.3 10e3/uL    Absolute Monocytes 0.9 0.0 - 1.3 10e3/uL    Absolute Eosinophils 0.1 0.0 - 0.7 10e3/uL    Absolute Basophils 0.0 0.0 - 0.2 10e3/uL    Absolute Immature Granulocytes 0.3 <=0.4 10e3/uL    Absolute NRBCs 0.0 10e3/uL   RBC and Platelet Morphology   Result Value Ref Range    RBC Morphology Confirmed RBC Indices     Platelet Assessment  Automated Count Confirmed. Platelet morphology is normal.     Automated Count Confirmed. Platelet morphology is normal.   Extra Blood Culture Bottle   Result Value Ref Range    Hold Specimen JIC    Glucose by meter   Result Value Ref Range    GLUCOSE BY METER POCT 287 (H) 70 - 99 mg/dL   ECG 12-LEAD WITH MUSE (LHE)   Result Value Ref Range    Systolic Blood Pressure 166 mmHg    Diastolic Blood Pressure 84 mmHg    Ventricular Rate 108 BPM    Atrial Rate 108 BPM    MN Interval 142 ms    QRS Duration 76 ms     ms    QTc 415 ms    P Axis 75 degrees    R AXIS 76 degrees    T Axis 68 degrees    Interpretation ECG       Sinus tachycardia  Otherwise normal ECG  When compared with ECG of 14-Jun-2023 22:03,  Vent. rate has increased by  39 bpm  Questionable change in QRS axis  Confirmed by SEE ED PROVIDER NOTE FOR, ECG INTERPRETATION (4000),  PAGE BLOOM (0435) on 3/3/2025 4:03:39 AM         RADIOLOGY:  Reviewed all pertinent imaging. Please see official radiology report.  No orders to display       EKG:    Performed at: 9:48 AM    Impression: Sinus tachycardia. Otherwise normal ECG. When compared ECG  of 14-Jun-2023, Vent. Rate has increased by 39 bpm. Questionable change in QRS axis.     Rate: 108 BPM  Rhythm: Sinus tachycardia  KS Interval: 142 ms  QRS Interval: 76 ms  QTc Interval: 415 ms  ST Changes: None  Comparison: When compared ECG of 14-Jun-2023, Vent. Rate has increased by 39 bpm. Questionable change in QRS axis.       I have independently reviewed and interpreted the EKG(s) documented above.        Yair Tucker MD  [facility] EMERGENCY DEPARTMENT  [address] [place] [id number] [phone]   =================================================================    BILLING:  Data  Category 1  Non-ED record review, if applicable. External record reviewed: Reviewed recent office visit Patient was seen [facility] in [place] on 02/27/25     Clinical information was obtained from an independent historian. History was obtained from: Patient     The following testing was considered but ultimately not selected after discussion with patient/family:  CT abdomen however benign abdominal exam.  No flank pain to suggest kidney stone or septic stone.     Category 2  My independent interpretation of EKG, rhythm strip, radiology study: N/A     Category 3  Discussion of management with other physician/healthcare provider/other source: N/A       Risk  Prescription medication was considered, but ultimately not given after discussion with patient/family: I considered ordering a prescription for narcotic pain medicine.  However, I feel patient's condition can be adequately treated with non-narcotic medications and that the risk of a narcotic pain medicine prescription outweighs the benefits.     Chronic conditions affecting care: Diabetes, Hyperlipemia, and GERD     Consideration of Admission/Observation: Considered and offered admission for hyperglycemia and urinary symptoms however patient declined admission.      I, Let Let, am serving as a scribe to document services  personally performed by Yair Tucker MD based on my observation and the provider's statements to me. I, Yair Tucker MD, attest that Let Gretta is acting in a scribe capacity, has observed my performance of the services and has documented them in accordance with my direction.     Yair Tucker MD  03/05/25 0818

## 2025-03-02 NOTE — ED TRIAGE NOTES
Patient arrives with [facility] from home, was seen in clinic last week for UTI found to have high blood sugar in 600's- he was told to come to ED but he refused. Patient has increasing UTI symptoms and blood sugars still uncontrolled     Triage Assessment (Adult)       Row Name 03/02/25 0942          Triage Assessment    Airway WDL WDL        Respiratory WDL    Respiratory WDL WDL        Skin Circulation/Temperature WDL    Skin Circulation/Temperature WDL WDL        Cardiac WDL    Cardiac WDL WDL        Peripheral/Neurovascular WDL    Peripheral Neurovascular WDL WDL        Cognitive/Neuro/Behavioral WDL    Cognitive/Neuro/Behavioral WDL WDL

## 2025-03-02 NOTE — PHARMACY-ADMISSION MEDICATION HISTORY
Pharmacist Admission Medication History    Admission medication history is complete. The information provided in this note is only as accurate as the sources available at the time of the update.    Information Source(s): Patient and CareEverywhere/SureScripts via in-person    Pertinent Information:   Patient stated that he is forgetful when taking his medications, and haven't taken most oral prescriptions since Monday.  Lantus: Patient stated that they received 3 doses of 10 units yesterday in the clinic at 1100, 1500, and 1900. I asked patient if he meant novolog or short acting insulin, but he confirmed it to be lantus  Cefuroxime: Patient would be on day 4 of 7 for therapy however hasn't gotten dose today.  Cyclobenzaprin/tizanidine: Patient confirmed taking both prn    Changes made to PTA medication list:  Added: Cefuroxime, cyclobenzaprine, irbesartan, metformin, nabumetone, rosuvastatin, tizanidine  Deleted: atorvastatin, gabapentin, oxycodone  Changed: Lantus (no instructions to 60units subcutaneous every morning)    Allergies reviewed with patient and updates made in EHR: yes    Medication History Completed By: Mike De Leon RPH 3/2/2025 10:30 AM    PTA Med List   Medication Sig Last Dose/Taking    cefuroxime (CEFTIN) 500 MG tablet Take 500 mg by mouth 2 times daily. 3/1/2025 Evening    cyclobenzaprine (FLEXERIL) 5 MG tablet Take 5-10 mg by mouth 3 times daily as needed for muscle spasms. Taking As Needed    glucose (BD GLUCOSE) 4 g chewable tablet Chew 3-4 tablets only as needed in case of low blood sugar (<70 mg/dL) 1/27/2025 Morning    irbesartan (AVAPRO) 150 MG tablet Take 150 mg by mouth daily. 1/27/2025 Morning    LANTUS SOLOSTAR 100 UNIT/ML soln Inject 60 Units subcutaneously every morning. 3/1/2025 Morning    metFORMIN (GLUCOPHAGE XR) 500 MG 24 hr tablet Take 2,000 mg by mouth daily (with breakfast). 1/27/2025 Morning    nabumetone (RELAFEN) 750 MG tablet Take 750 mg by mouth 2 times daily as  needed for pain. Taking As Needed    rosuvastatin (CRESTOR) 5 MG tablet Take 5 mg by mouth daily. 1/27/2025 Morning    tiZANidine (ZANAFLEX) 4 MG tablet Take 4 mg by mouth nightly as needed for muscle spasms. Taking As Needed

## 2025-03-03 LAB
ATRIAL RATE - MUSE: 108 BPM
DIASTOLIC BLOOD PRESSURE - MUSE: 84 MMHG
INTERPRETATION ECG - MUSE: NORMAL
P AXIS - MUSE: 75 DEGREES
PR INTERVAL - MUSE: 142 MS
QRS DURATION - MUSE: 76 MS
QT - MUSE: 310 MS
QTC - MUSE: 415 MS
R AXIS - MUSE: 76 DEGREES
SYSTOLIC BLOOD PRESSURE - MUSE: 166 MMHG
T AXIS - MUSE: 68 DEGREES
VENTRICULAR RATE- MUSE: 108 BPM

## 2025-03-16 ENCOUNTER — HEALTH MAINTENANCE LETTER (OUTPATIENT)
Age: 65
End: 2025-03-16

## 2025-04-14 ENCOUNTER — APPOINTMENT (OUTPATIENT)
Dept: CT IMAGING | Facility: HOSPITAL | Age: 65
End: 2025-04-14
Attending: EMERGENCY MEDICINE
Payer: COMMERCIAL

## 2025-04-14 ENCOUNTER — HOSPITAL ENCOUNTER (INPATIENT)
Facility: HOSPITAL | Age: 65
LOS: 1 days | Discharge: HOME | End: 2025-04-16
Attending: EMERGENCY MEDICINE | Admitting: FAMILY MEDICINE
Payer: COMMERCIAL

## 2025-04-14 ENCOUNTER — ANESTHESIA (OUTPATIENT)
Dept: SURGERY | Facility: HOSPITAL | Age: 65
End: 2025-04-14
Payer: COMMERCIAL

## 2025-04-14 ENCOUNTER — ANESTHESIA EVENT (OUTPATIENT)
Dept: SURGERY | Facility: HOSPITAL | Age: 65
End: 2025-04-14
Payer: COMMERCIAL

## 2025-04-14 DIAGNOSIS — E11.29 TYPE 2 DIABETES MELLITUS WITH MICROALBUMINURIA, WITH LONG-TERM CURRENT USE OF INSULIN (H): ICD-10-CM

## 2025-04-14 DIAGNOSIS — N12 PYELONEPHRITIS: ICD-10-CM

## 2025-04-14 DIAGNOSIS — Z79.4 TYPE 2 DIABETES MELLITUS WITH MICROALBUMINURIA, WITH LONG-TERM CURRENT USE OF INSULIN (H): ICD-10-CM

## 2025-04-14 DIAGNOSIS — N41.9 PROSTATITIS, UNSPECIFIED PROSTATITIS TYPE: ICD-10-CM

## 2025-04-14 DIAGNOSIS — R80.9 TYPE 2 DIABETES MELLITUS WITH MICROALBUMINURIA, WITH LONG-TERM CURRENT USE OF INSULIN (H): ICD-10-CM

## 2025-04-14 DIAGNOSIS — N41.2 PROSTATE ABSCESS: Primary | ICD-10-CM

## 2025-04-14 LAB
ALBUMIN SERPL BCG-MCNC: 4.4 G/DL (ref 3.5–5.2)
ALBUMIN UR-MCNC: 300 MG/DL
ALP SERPL-CCNC: 112 U/L (ref 40–150)
ALT SERPL W P-5'-P-CCNC: 23 U/L (ref 0–70)
ANION GAP SERPL CALCULATED.3IONS-SCNC: 11 MMOL/L (ref 7–15)
APPEARANCE UR: ABNORMAL
AST SERPL W P-5'-P-CCNC: 15 U/L (ref 0–45)
BACTERIA #/AREA URNS HPF: ABNORMAL /HPF
BASOPHILS # BLD AUTO: 0.1 10E3/UL (ref 0–0.2)
BASOPHILS NFR BLD AUTO: 1 %
BILIRUB SERPL-MCNC: 0.9 MG/DL
BILIRUB UR QL STRIP: NEGATIVE
BUN SERPL-MCNC: 16 MG/DL (ref 8–23)
CALCIUM SERPL-MCNC: 10 MG/DL (ref 8.8–10.4)
CHLORIDE SERPL-SCNC: 99 MMOL/L (ref 98–107)
COLOR UR AUTO: ABNORMAL
CREAT SERPL-MCNC: 1.04 MG/DL (ref 0.67–1.17)
EGFRCR SERPLBLD CKD-EPI 2021: 80 ML/MIN/1.73M2
EOSINOPHIL # BLD AUTO: 0.2 10E3/UL (ref 0–0.7)
EOSINOPHIL NFR BLD AUTO: 1 %
ERYTHROCYTE [DISTWIDTH] IN BLOOD BY AUTOMATED COUNT: 12.9 % (ref 10–15)
GLUCOSE BLDC GLUCOMTR-MCNC: 100 MG/DL (ref 70–99)
GLUCOSE BLDC GLUCOMTR-MCNC: 103 MG/DL (ref 70–99)
GLUCOSE BLDC GLUCOMTR-MCNC: 152 MG/DL (ref 70–99)
GLUCOSE BLDC GLUCOMTR-MCNC: 214 MG/DL (ref 70–99)
GLUCOSE BLDC GLUCOMTR-MCNC: 97 MG/DL (ref 70–99)
GLUCOSE SERPL-MCNC: 312 MG/DL (ref 70–99)
GLUCOSE UR STRIP-MCNC: 300 MG/DL
HCO3 SERPL-SCNC: 26 MMOL/L (ref 22–29)
HCT VFR BLD AUTO: 41.3 % (ref 40–53)
HGB BLD-MCNC: 14.8 G/DL (ref 13.3–17.7)
HGB UR QL STRIP: ABNORMAL
HOLD SPECIMEN: NORMAL
HOLD SPECIMEN: NORMAL
IMM GRANULOCYTES # BLD: 0.1 10E3/UL
IMM GRANULOCYTES NFR BLD: 1 %
KETONES UR STRIP-MCNC: NEGATIVE MG/DL
LACTATE SERPL-SCNC: 1.5 MMOL/L (ref 0.7–2)
LEUKOCYTE ESTERASE UR QL STRIP: ABNORMAL
LIPASE SERPL-CCNC: 25 U/L (ref 13–60)
LYMPHOCYTES # BLD AUTO: 2.5 10E3/UL (ref 0.8–5.3)
LYMPHOCYTES NFR BLD AUTO: 15 %
MCH RBC QN AUTO: 31.4 PG (ref 26.5–33)
MCHC RBC AUTO-ENTMCNC: 35.8 G/DL (ref 31.5–36.5)
MCV RBC AUTO: 88 FL (ref 78–100)
MONOCYTES # BLD AUTO: 1.6 10E3/UL (ref 0–1.3)
MONOCYTES NFR BLD AUTO: 10 %
MUCOUS THREADS #/AREA URNS LPF: PRESENT /LPF
NEUTROPHILS # BLD AUTO: 12.1 10E3/UL (ref 1.6–8.3)
NEUTROPHILS NFR BLD AUTO: 73 %
NITRATE UR QL: POSITIVE
NRBC # BLD AUTO: 0 10E3/UL
NRBC BLD AUTO-RTO: 0 /100
PH UR STRIP: 5.5 [PH] (ref 5–7)
PLAT MORPH BLD: NORMAL
PLATELET # BLD AUTO: 260 10E3/UL (ref 150–450)
POTASSIUM SERPL-SCNC: 4.6 MMOL/L (ref 3.4–5.3)
PROT SERPL-MCNC: 8.1 G/DL (ref 6.4–8.3)
RBC # BLD AUTO: 4.71 10E6/UL (ref 4.4–5.9)
RBC MORPH BLD: NORMAL
RBC URINE: 0 /HPF
SODIUM SERPL-SCNC: 136 MMOL/L (ref 135–145)
SP GR UR STRIP: 1.02 (ref 1–1.03)
UROBILINOGEN UR STRIP-MCNC: NORMAL MG/DL
WBC # BLD AUTO: 16.5 10E3/UL (ref 4–11)
WBC CLUMPS #/AREA URNS HPF: PRESENT /HPF
WBC URINE: >182 /HPF

## 2025-04-14 PROCEDURE — 99285 EMERGENCY DEPT VISIT HI MDM: CPT | Mod: 25

## 2025-04-14 PROCEDURE — 36415 COLL VENOUS BLD VENIPUNCTURE: CPT | Performed by: EMERGENCY MEDICINE

## 2025-04-14 PROCEDURE — 250N000011 HC RX IP 250 OP 636: Mod: JZ | Performed by: EMERGENCY MEDICINE

## 2025-04-14 PROCEDURE — 999N000141 HC STATISTIC PRE-PROCEDURE NURSING ASSESSMENT: Performed by: UROLOGY

## 2025-04-14 PROCEDURE — G0378 HOSPITAL OBSERVATION PER HR: HCPCS

## 2025-04-14 PROCEDURE — 370N000017 HC ANESTHESIA TECHNICAL FEE, PER MIN: Performed by: UROLOGY

## 2025-04-14 PROCEDURE — 258N000003 HC RX IP 258 OP 636: Performed by: UROLOGY

## 2025-04-14 PROCEDURE — 250N000011 HC RX IP 250 OP 636: Performed by: EMERGENCY MEDICINE

## 2025-04-14 PROCEDURE — 250N000009 HC RX 250: Performed by: NURSE ANESTHETIST, CERTIFIED REGISTERED

## 2025-04-14 PROCEDURE — 250N000011 HC RX IP 250 OP 636: Performed by: NURSE ANESTHETIST, CERTIFIED REGISTERED

## 2025-04-14 PROCEDURE — 272N000001 HC OR GENERAL SUPPLY STERILE: Performed by: UROLOGY

## 2025-04-14 PROCEDURE — 83690 ASSAY OF LIPASE: CPT | Performed by: EMERGENCY MEDICINE

## 2025-04-14 PROCEDURE — 82962 GLUCOSE BLOOD TEST: CPT

## 2025-04-14 PROCEDURE — 258N000003 HC RX IP 258 OP 636: Performed by: PAIN MEDICINE

## 2025-04-14 PROCEDURE — 87040 BLOOD CULTURE FOR BACTERIA: CPT | Performed by: EMERGENCY MEDICINE

## 2025-04-14 PROCEDURE — 83605 ASSAY OF LACTIC ACID: CPT | Performed by: EMERGENCY MEDICINE

## 2025-04-14 PROCEDURE — 250N000011 HC RX IP 250 OP 636

## 2025-04-14 PROCEDURE — 250N000009 HC RX 250: Performed by: UROLOGY

## 2025-04-14 PROCEDURE — 0V908ZZ DRAINAGE OF PROSTATE, VIA NATURAL OR ARTIFICIAL OPENING ENDOSCOPIC: ICD-10-PCS | Performed by: UROLOGY

## 2025-04-14 PROCEDURE — 250N000012 HC RX MED GY IP 250 OP 636 PS 637

## 2025-04-14 PROCEDURE — 250N000011 HC RX IP 250 OP 636: Performed by: NURSE PRACTITIONER

## 2025-04-14 PROCEDURE — 258N000001 HC RX 258: Performed by: UROLOGY

## 2025-04-14 PROCEDURE — 74177 CT ABD & PELVIS W/CONTRAST: CPT

## 2025-04-14 PROCEDURE — 82247 BILIRUBIN TOTAL: CPT | Performed by: EMERGENCY MEDICINE

## 2025-04-14 PROCEDURE — 99222 1ST HOSP IP/OBS MODERATE 55: CPT | Mod: AI

## 2025-04-14 PROCEDURE — 96365 THER/PROPH/DIAG IV INF INIT: CPT

## 2025-04-14 PROCEDURE — 81001 URINALYSIS AUTO W/SCOPE: CPT | Performed by: EMERGENCY MEDICINE

## 2025-04-14 PROCEDURE — 87186 SC STD MICRODIL/AGAR DIL: CPT | Performed by: EMERGENCY MEDICINE

## 2025-04-14 PROCEDURE — 258N000003 HC RX IP 258 OP 636: Performed by: NURSE ANESTHETIST, CERTIFIED REGISTERED

## 2025-04-14 PROCEDURE — 96361 HYDRATE IV INFUSION ADD-ON: CPT

## 2025-04-14 PROCEDURE — 96367 TX/PROPH/DG ADDL SEQ IV INF: CPT

## 2025-04-14 PROCEDURE — 82040 ASSAY OF SERUM ALBUMIN: CPT | Performed by: EMERGENCY MEDICINE

## 2025-04-14 PROCEDURE — 85025 COMPLETE CBC W/AUTO DIFF WBC: CPT | Performed by: EMERGENCY MEDICINE

## 2025-04-14 PROCEDURE — 88305 TISSUE EXAM BY PATHOLOGIST: CPT | Mod: 26 | Performed by: PATHOLOGY

## 2025-04-14 PROCEDURE — 88305 TISSUE EXAM BY PATHOLOGIST: CPT | Mod: TC | Performed by: UROLOGY

## 2025-04-14 PROCEDURE — 96375 TX/PRO/DX INJ NEW DRUG ADDON: CPT

## 2025-04-14 PROCEDURE — 250N000025 HC SEVOFLURANE, PER MIN: Performed by: UROLOGY

## 2025-04-14 PROCEDURE — 258N000003 HC RX IP 258 OP 636: Performed by: EMERGENCY MEDICINE

## 2025-04-14 PROCEDURE — 93005 ELECTROCARDIOGRAM TRACING: CPT

## 2025-04-14 PROCEDURE — 360N000076 HC SURGERY LEVEL 3, PER MIN: Performed by: UROLOGY

## 2025-04-14 PROCEDURE — 710N000009 HC RECOVERY PHASE 1, LEVEL 1, PER MIN: Performed by: UROLOGY

## 2025-04-14 RX ORDER — FENTANYL CITRATE 50 UG/ML
INJECTION, SOLUTION INTRAMUSCULAR; INTRAVENOUS PRN
Status: DISCONTINUED | OUTPATIENT
Start: 2025-04-14 | End: 2025-04-14

## 2025-04-14 RX ORDER — IOPAMIDOL 755 MG/ML
90 INJECTION, SOLUTION INTRAVASCULAR ONCE
Status: COMPLETED | OUTPATIENT
Start: 2025-04-14 | End: 2025-04-14

## 2025-04-14 RX ORDER — OXYCODONE HYDROCHLORIDE 5 MG/1
5 TABLET ORAL
Status: DISCONTINUED | OUTPATIENT
Start: 2025-04-14 | End: 2025-04-14 | Stop reason: HOSPADM

## 2025-04-14 RX ORDER — NICOTINE POLACRILEX 4 MG
15-30 LOZENGE BUCCAL
Status: DISCONTINUED | OUTPATIENT
Start: 2025-04-14 | End: 2025-04-16 | Stop reason: HOSPADM

## 2025-04-14 RX ORDER — ONDANSETRON 2 MG/ML
4 INJECTION INTRAMUSCULAR; INTRAVENOUS EVERY 6 HOURS PRN
Status: DISCONTINUED | OUTPATIENT
Start: 2025-04-14 | End: 2025-04-16 | Stop reason: HOSPADM

## 2025-04-14 RX ORDER — LIDOCAINE HYDROCHLORIDE 20 MG/ML
INJECTION, SOLUTION INFILTRATION; PERINEURAL PRN
Status: DISCONTINUED | OUTPATIENT
Start: 2025-04-14 | End: 2025-04-14

## 2025-04-14 RX ORDER — LEVOFLOXACIN 5 MG/ML
500 INJECTION, SOLUTION INTRAVENOUS EVERY 24 HOURS
Status: DISCONTINUED | OUTPATIENT
Start: 2025-04-15 | End: 2025-04-14

## 2025-04-14 RX ORDER — FENTANYL CITRATE 50 UG/ML
50 INJECTION, SOLUTION INTRAMUSCULAR; INTRAVENOUS EVERY 5 MIN PRN
Status: DISCONTINUED | OUTPATIENT
Start: 2025-04-14 | End: 2025-04-14 | Stop reason: HOSPADM

## 2025-04-14 RX ORDER — ONDANSETRON 2 MG/ML
4 INJECTION INTRAMUSCULAR; INTRAVENOUS EVERY 30 MIN PRN
Status: DISCONTINUED | OUTPATIENT
Start: 2025-04-14 | End: 2025-04-14 | Stop reason: HOSPADM

## 2025-04-14 RX ORDER — CEFTRIAXONE 1 G/1
1 INJECTION, POWDER, FOR SOLUTION INTRAMUSCULAR; INTRAVENOUS ONCE
Status: COMPLETED | OUTPATIENT
Start: 2025-04-14 | End: 2025-04-14

## 2025-04-14 RX ORDER — ONDANSETRON 4 MG/1
4 TABLET, ORALLY DISINTEGRATING ORAL EVERY 6 HOURS PRN
Status: DISCONTINUED | OUTPATIENT
Start: 2025-04-14 | End: 2025-04-16 | Stop reason: HOSPADM

## 2025-04-14 RX ORDER — SODIUM CHLORIDE, SODIUM LACTATE, POTASSIUM CHLORIDE, CALCIUM CHLORIDE 600; 310; 30; 20 MG/100ML; MG/100ML; MG/100ML; MG/100ML
INJECTION, SOLUTION INTRAVENOUS CONTINUOUS PRN
Status: DISCONTINUED | OUTPATIENT
Start: 2025-04-14 | End: 2025-04-14

## 2025-04-14 RX ORDER — LIDOCAINE 40 MG/G
CREAM TOPICAL
Status: DISCONTINUED | OUTPATIENT
Start: 2025-04-14 | End: 2025-04-16 | Stop reason: HOSPADM

## 2025-04-14 RX ORDER — ONDANSETRON 2 MG/ML
4 INJECTION INTRAMUSCULAR; INTRAVENOUS EVERY 30 MIN PRN
Status: DISCONTINUED | OUTPATIENT
Start: 2025-04-14 | End: 2025-04-14

## 2025-04-14 RX ORDER — DEXAMETHASONE SODIUM PHOSPHATE 4 MG/ML
4 INJECTION, SOLUTION INTRA-ARTICULAR; INTRALESIONAL; INTRAMUSCULAR; INTRAVENOUS; SOFT TISSUE
Status: DISCONTINUED | OUTPATIENT
Start: 2025-04-14 | End: 2025-04-14 | Stop reason: HOSPADM

## 2025-04-14 RX ORDER — DEXTROSE MONOHYDRATE 25 G/50ML
25-50 INJECTION, SOLUTION INTRAVENOUS
Status: DISCONTINUED | OUTPATIENT
Start: 2025-04-14 | End: 2025-04-16 | Stop reason: HOSPADM

## 2025-04-14 RX ORDER — SODIUM CHLORIDE, SODIUM LACTATE, POTASSIUM CHLORIDE, CALCIUM CHLORIDE 600; 310; 30; 20 MG/100ML; MG/100ML; MG/100ML; MG/100ML
INJECTION, SOLUTION INTRAVENOUS CONTINUOUS
Status: DISCONTINUED | OUTPATIENT
Start: 2025-04-14 | End: 2025-04-14 | Stop reason: HOSPADM

## 2025-04-14 RX ORDER — PIPERACILLIN SODIUM, TAZOBACTAM SODIUM 3; .375 G/15ML; G/15ML
3.38 INJECTION, POWDER, LYOPHILIZED, FOR SOLUTION INTRAVENOUS EVERY 8 HOURS
Status: DISCONTINUED | OUTPATIENT
Start: 2025-04-14 | End: 2025-04-16

## 2025-04-14 RX ORDER — AMOXICILLIN 250 MG
2 CAPSULE ORAL 2 TIMES DAILY PRN
Status: DISCONTINUED | OUTPATIENT
Start: 2025-04-14 | End: 2025-04-16 | Stop reason: HOSPADM

## 2025-04-14 RX ORDER — CEFAZOLIN SODIUM 2 G/50ML
2 SOLUTION INTRAVENOUS SEE ADMIN INSTRUCTIONS
Status: DISCONTINUED | OUTPATIENT
Start: 2025-04-14 | End: 2025-04-15

## 2025-04-14 RX ORDER — KETOROLAC TROMETHAMINE 15 MG/ML
15 INJECTION, SOLUTION INTRAMUSCULAR; INTRAVENOUS ONCE
Status: COMPLETED | OUTPATIENT
Start: 2025-04-14 | End: 2025-04-14

## 2025-04-14 RX ORDER — LEVOFLOXACIN 5 MG/ML
500 INJECTION, SOLUTION INTRAVENOUS ONCE
Status: COMPLETED | OUTPATIENT
Start: 2025-04-14 | End: 2025-04-14

## 2025-04-14 RX ORDER — AMOXICILLIN 250 MG
1 CAPSULE ORAL 2 TIMES DAILY PRN
Status: DISCONTINUED | OUTPATIENT
Start: 2025-04-14 | End: 2025-04-16 | Stop reason: HOSPADM

## 2025-04-14 RX ORDER — ACETAMINOPHEN 325 MG/1
650 TABLET ORAL EVERY 4 HOURS PRN
Status: DISCONTINUED | OUTPATIENT
Start: 2025-04-14 | End: 2025-04-16 | Stop reason: HOSPADM

## 2025-04-14 RX ORDER — PIPERACILLIN SODIUM, TAZOBACTAM SODIUM 3; .375 G/15ML; G/15ML
3.38 INJECTION, POWDER, LYOPHILIZED, FOR SOLUTION INTRAVENOUS ONCE
Status: COMPLETED | OUTPATIENT
Start: 2025-04-14 | End: 2025-04-14

## 2025-04-14 RX ORDER — DEXAMETHASONE SODIUM PHOSPHATE 10 MG/ML
INJECTION, SOLUTION INTRAMUSCULAR; INTRAVENOUS PRN
Status: DISCONTINUED | OUTPATIENT
Start: 2025-04-14 | End: 2025-04-14

## 2025-04-14 RX ORDER — NALOXONE HYDROCHLORIDE 1 MG/ML
0.1 INJECTION INTRAMUSCULAR; INTRAVENOUS; SUBCUTANEOUS
Status: DISCONTINUED | OUTPATIENT
Start: 2025-04-14 | End: 2025-04-14 | Stop reason: HOSPADM

## 2025-04-14 RX ORDER — ONDANSETRON 2 MG/ML
INJECTION INTRAMUSCULAR; INTRAVENOUS PRN
Status: DISCONTINUED | OUTPATIENT
Start: 2025-04-14 | End: 2025-04-14

## 2025-04-14 RX ORDER — SODIUM CHLORIDE, SODIUM LACTATE, POTASSIUM CHLORIDE, CALCIUM CHLORIDE 600; 310; 30; 20 MG/100ML; MG/100ML; MG/100ML; MG/100ML
INJECTION, SOLUTION INTRAVENOUS CONTINUOUS
Status: DISCONTINUED | OUTPATIENT
Start: 2025-04-14 | End: 2025-04-15

## 2025-04-14 RX ORDER — FENTANYL CITRATE 50 UG/ML
25 INJECTION, SOLUTION INTRAMUSCULAR; INTRAVENOUS EVERY 5 MIN PRN
Status: DISCONTINUED | OUTPATIENT
Start: 2025-04-14 | End: 2025-04-14 | Stop reason: HOSPADM

## 2025-04-14 RX ORDER — CEFAZOLIN SODIUM 2 G/50ML
2 SOLUTION INTRAVENOUS
Status: COMPLETED | OUTPATIENT
Start: 2025-04-14 | End: 2025-04-14

## 2025-04-14 RX ORDER — NALOXONE HYDROCHLORIDE 0.4 MG/ML
0.1 INJECTION, SOLUTION INTRAMUSCULAR; INTRAVENOUS; SUBCUTANEOUS
Status: DISCONTINUED | OUTPATIENT
Start: 2025-04-14 | End: 2025-04-14 | Stop reason: HOSPADM

## 2025-04-14 RX ORDER — ONDANSETRON 4 MG/1
4 TABLET, ORALLY DISINTEGRATING ORAL EVERY 30 MIN PRN
Status: DISCONTINUED | OUTPATIENT
Start: 2025-04-14 | End: 2025-04-14 | Stop reason: HOSPADM

## 2025-04-14 RX ORDER — PROCHLORPERAZINE MALEATE 10 MG
10 TABLET ORAL EVERY 6 HOURS PRN
Status: DISCONTINUED | OUTPATIENT
Start: 2025-04-14 | End: 2025-04-16 | Stop reason: HOSPADM

## 2025-04-14 RX ORDER — CEFAZOLIN SODIUM/WATER 2 G/20 ML
SYRINGE (ML) INTRAVENOUS
Status: DISCONTINUED
Start: 2025-04-14 | End: 2025-04-14 | Stop reason: HOSPADM

## 2025-04-14 RX ORDER — CALCIUM CARBONATE 500 MG/1
1000 TABLET, CHEWABLE ORAL 4 TIMES DAILY PRN
Status: DISCONTINUED | OUTPATIENT
Start: 2025-04-14 | End: 2025-04-16 | Stop reason: HOSPADM

## 2025-04-14 RX ORDER — OXYCODONE HYDROCHLORIDE 5 MG/1
10 TABLET ORAL
Status: DISCONTINUED | OUTPATIENT
Start: 2025-04-14 | End: 2025-04-14 | Stop reason: HOSPADM

## 2025-04-14 RX ORDER — PROPOFOL 10 MG/ML
INJECTION, EMULSION INTRAVENOUS PRN
Status: DISCONTINUED | OUTPATIENT
Start: 2025-04-14 | End: 2025-04-14

## 2025-04-14 RX ORDER — ACETAMINOPHEN 650 MG/1
650 SUPPOSITORY RECTAL EVERY 4 HOURS PRN
Status: DISCONTINUED | OUTPATIENT
Start: 2025-04-14 | End: 2025-04-16 | Stop reason: HOSPADM

## 2025-04-14 RX ADMIN — LEVOFLOXACIN 500 MG: 5 INJECTION, SOLUTION INTRAVENOUS at 11:01

## 2025-04-14 RX ADMIN — KETOROLAC TROMETHAMINE 15 MG: 15 INJECTION, SOLUTION INTRAMUSCULAR; INTRAVENOUS at 09:40

## 2025-04-14 RX ADMIN — ROCURONIUM 50 MG: 50 INJECTION, SOLUTION INTRAVENOUS at 19:20

## 2025-04-14 RX ADMIN — HYDROMORPHONE HYDROCHLORIDE 0.5 MG: 1 INJECTION, SOLUTION INTRAMUSCULAR; INTRAVENOUS; SUBCUTANEOUS at 19:39

## 2025-04-14 RX ADMIN — LIDOCAINE HYDROCHLORIDE 100 MG: 20 INJECTION, SOLUTION INFILTRATION; PERINEURAL at 19:20

## 2025-04-14 RX ADMIN — IOPAMIDOL 90 ML: 755 INJECTION, SOLUTION INTRAVENOUS at 09:31

## 2025-04-14 RX ADMIN — HYDROMORPHONE HYDROCHLORIDE 0.5 MG: 1 INJECTION, SOLUTION INTRAMUSCULAR; INTRAVENOUS; SUBCUTANEOUS at 21:10

## 2025-04-14 RX ADMIN — ROCURONIUM 10 MG: 50 INJECTION, SOLUTION INTRAVENOUS at 20:02

## 2025-04-14 RX ADMIN — DEXAMETHASONE SODIUM PHOSPHATE 10 MG: 10 INJECTION, SOLUTION INTRAMUSCULAR; INTRAVENOUS at 19:30

## 2025-04-14 RX ADMIN — ONDANSETRON 4 MG: 2 INJECTION INTRAMUSCULAR; INTRAVENOUS at 19:26

## 2025-04-14 RX ADMIN — CEFTRIAXONE SODIUM 1 G: 1 INJECTION, POWDER, FOR SOLUTION INTRAMUSCULAR; INTRAVENOUS at 09:41

## 2025-04-14 RX ADMIN — SODIUM CHLORIDE 1000 ML: 0.9 INJECTION, SOLUTION INTRAVENOUS at 09:39

## 2025-04-14 RX ADMIN — PIPERACILLIN AND TAZOBACTAM 3.38 G: 3; .375 INJECTION, POWDER, FOR SOLUTION INTRAVENOUS at 17:46

## 2025-04-14 RX ADMIN — SODIUM CHLORIDE, SODIUM LACTATE, POTASSIUM CHLORIDE, AND CALCIUM CHLORIDE: .6; .31; .03; .02 INJECTION, SOLUTION INTRAVENOUS at 18:13

## 2025-04-14 RX ADMIN — CEFAZOLIN SODIUM 2 G: 2 SOLUTION INTRAVENOUS at 19:23

## 2025-04-14 RX ADMIN — SODIUM CHLORIDE FOR IRRIGATION 3000 ML: 0.9 SOLUTION IRRIGATION at 23:16

## 2025-04-14 RX ADMIN — INSULIN ASPART 1 UNITS: 100 INJECTION, SOLUTION INTRAVENOUS; SUBCUTANEOUS at 23:16

## 2025-04-14 RX ADMIN — SODIUM CHLORIDE, SODIUM LACTATE, POTASSIUM CHLORIDE, AND CALCIUM CHLORIDE: .6; .31; .03; .02 INJECTION, SOLUTION INTRAVENOUS at 23:16

## 2025-04-14 RX ADMIN — INSULIN ASPART 3 UNITS: 100 INJECTION, SOLUTION INTRAVENOUS; SUBCUTANEOUS at 13:36

## 2025-04-14 RX ADMIN — FENTANYL CITRATE 100 MCG: 50 INJECTION, SOLUTION INTRAMUSCULAR; INTRAVENOUS at 19:19

## 2025-04-14 RX ADMIN — ROCURONIUM 10 MG: 50 INJECTION, SOLUTION INTRAVENOUS at 21:00

## 2025-04-14 RX ADMIN — SODIUM CHLORIDE, SODIUM LACTATE, POTASSIUM CHLORIDE, AND CALCIUM CHLORIDE: .6; .31; .03; .02 INJECTION, SOLUTION INTRAVENOUS at 19:18

## 2025-04-14 RX ADMIN — SUGAMMADEX 210 MG: 100 INJECTION, SOLUTION INTRAVENOUS at 21:14

## 2025-04-14 RX ADMIN — ONDANSETRON 4 MG: 2 INJECTION, SOLUTION INTRAMUSCULAR; INTRAVENOUS at 09:39

## 2025-04-14 RX ADMIN — PIPERACILLIN AND TAZOBACTAM 3.38 G: 3; .375 INJECTION, POWDER, FOR SOLUTION INTRAVENOUS at 12:03

## 2025-04-14 RX ADMIN — PROPOFOL 200 MG: 10 INJECTION, EMULSION INTRAVENOUS at 19:20

## 2025-04-14 ASSESSMENT — COLUMBIA-SUICIDE SEVERITY RATING SCALE - C-SSRS
2. HAVE YOU ACTUALLY HAD ANY THOUGHTS OF KILLING YOURSELF IN THE PAST MONTH?: NO
6. HAVE YOU EVER DONE ANYTHING, STARTED TO DO ANYTHING, OR PREPARED TO DO ANYTHING TO END YOUR LIFE?: NO
1. IN THE PAST MONTH, HAVE YOU WISHED YOU WERE DEAD OR WISHED YOU COULD GO TO SLEEP AND NOT WAKE UP?: NO

## 2025-04-14 ASSESSMENT — ACTIVITIES OF DAILY LIVING (ADL)
ADLS_ACUITY_SCORE: 41
ADLS_ACUITY_SCORE: 41
ADLS_ACUITY_SCORE: 18
ADLS_ACUITY_SCORE: 18
ADLS_ACUITY_SCORE: 41
ADLS_ACUITY_SCORE: 18
ADLS_ACUITY_SCORE: 41
ADLS_ACUITY_SCORE: 18
ADLS_ACUITY_SCORE: 41
ADLS_ACUITY_SCORE: 18
ADLS_ACUITY_SCORE: 41

## 2025-04-14 ASSESSMENT — ENCOUNTER SYMPTOMS
ABDOMINAL PAIN: 0
COUGH: 0
DYSURIA: 1
CONSTIPATION: 1
VOMITING: 0
SHORTNESS OF BREATH: 0
NAUSEA: 0
CHILLS: 1
HEADACHES: 1
BACK PAIN: 1
FEVER: 1

## 2025-04-14 NOTE — INTERVAL H&P NOTE
"I have reviewed the surgical (or preoperative) H&P that is linked to this encounter, and examined the patient. There are no significant changes    Clinical Conditions Present on Arrival:  Clinically Significant Risk Factors Present on Admission                       # Obesity: Estimated body mass index is 33.4 kg/m  as calculated from the following:    Height as of this encounter: 1.753 m (5' 9\").    Weight as of this encounter: 102.6 kg (226 lb 3.1 oz).       "

## 2025-04-14 NOTE — PHARMACY-ADMISSION MEDICATION HISTORY
Pharmacist Admission Medication History    Admission medication history is complete. The information provided in this note is only as accurate as the sources available at the time of the update.    Information Source(s): Patient via in-person    Pertinent Information:     Changes made to PTA medication list:  Added: None  Deleted: None  Changed: None    Allergies reviewed with patient and updates made in EHR: no    Medication History Completed By: Leandro Lutz RPH 4/14/2025 12:02 PM    PTA Med List   Medication Sig Last Dose/Taking    cyclobenzaprine (FLEXERIL) 5 MG tablet Take 5-10 mg by mouth 3 times daily as needed for muscle spasms. Unknown    irbesartan (AVAPRO) 150 MG tablet Take 150 mg by mouth daily. Taking    LANTUS SOLOSTAR 100 UNIT/ML soln Inject 60 Units subcutaneously every morning. 4/14/2025 Morning    metFORMIN (GLUCOPHAGE XR) 500 MG 24 hr tablet Take 2,000 mg by mouth daily (with breakfast). 4/12/2025    nabumetone (RELAFEN) 750 MG tablet Take 750 mg by mouth 2 times daily as needed for pain. Past Month    tiZANidine (ZANAFLEX) 4 MG tablet Take 4 mg by mouth nightly as needed for muscle spasms. Past Month

## 2025-04-14 NOTE — LETTER
[place] [address] [facility] [address]  Phone: [phone]  Fax: [phone]    April 16, 2025        Scot Spaulding  [facility] [place] [address] [place] [address]          To whom it may concern:    RE: Scot ANN Jasson    Patient was seen and treated at [facility] from 4/14-4/16 and missed work.    Please contact me for questions or concerns.      Sincerely,    Kate Lyle, DO

## 2025-04-14 NOTE — ED TRIAGE NOTES
He has had generalized body aches fever and headache that started last night. He has trouble urinating.

## 2025-04-14 NOTE — H&P (VIEW-ONLY)
[facility] CON[facility]ULT      Type of consult: inpatient  Place of service: [facility]   Reason for consult: Prostate abscess, prostatitis  Requested by: Dr. Jenkins    History of present illness:  Scot Spaulding is a 64 year old male history of poorly controlled diabetes, hyperlipidemia, GERD, pyelonephritis (treated with Ceftin 2/27/25 and Cipro BID 3/19/25, pt was seen at [facility] 3/19, was tachycardic with leukocytosis and declined recommendation to go to ED), advanced degenerative arthritis of the left knee status post arthrocentesis 2/24/2025; Presented to the ED today with complaint of subjective fevers, generalized body aches, headache, urinary frequency and recent incontinent episode.          Patient is admitted to the hospital for cystitis, prostatitis with prostate abscess noted on CT.  A urology consult was placed for prostatic abscess, cystitis, prostatitis. History obtained through the patient, Dr. Jenkins and chart review.     CT abdomen pelvis 4/14/2025 showed right greater than left abnormal appearing prostate consistent with prostatitis with abscess (4 x 3 x 2.4 cm on the right), mild circumferential wall thickening of the bladder consistent with cystitis with adjacent perivesicular adenopathy    UA RESULTS:  Recent Labs   Lab Test 04/14/25  0827   COLOR Blanca*   APPEARANCE Cloudy*   URINEGLC 300*   URINEBILI Negative   URINEKETONE Negative   SG 1.024   UBLD 0.2 mg/dL*   URINEPH 5.5   PROTEIN 300*   NITRITE Positive*   LEUKEST 500 Leobardo/uL*   RBCU 0   WBCU >182*     UC and BC 4/14/25: Pending    Cultures:  Urine Culture 4/14: pending   Urine Culture 3/19/25 ([facility]): E.coli (intermediate to Levofloxacin and ciprofloxacin).     Creatinine: WNL  WBC: 16.5  Tmax 99.2    HgbA1C 3/4/25: 11.7    Pt reports mild left flank pain this AM only, now resolved. No other recent flank pain. Reports he completed his recent courses of antibiotics, but is uncertain the length of  each course. Last BM: 4/12, no pain with BM. Denies scrotal/testicular/penile pain.     Patient reports he was diagnosed with herpes a long time ago and has not been sexually active since. No hx of GC or HIV.     Past medical history  Past Medical History:   Diagnosis Date    Diabetes (H)        Past surgical history  No past surgical history on file.    Social history  Social History     Socioeconomic History    Marital status: Single     Spouse name: Not on file    Number of children: Not on file    Years of education: Not on file    Highest education level: Not on file   Occupational History    Not on file   Tobacco Use    Smoking status: Never    Smokeless tobacco: Never   Substance and Sexual Activity    Alcohol use: Not on file    Drug use: Not on file    Sexual activity: Not on file   Other Topics Concern    Not on file   Social History Narrative    Not on file     Social Drivers of Health     Financial Resource Strain: Medium Risk (1/7/2025)    [facility]    Financial Resource Strain     Difficulty of Paying Living Expenses: 2     Difficulty of Paying Living Expenses: 1   Food Insecurity: No Food Insecurity (1/2/2025)    Received from [facility]    Food Insecurity     Do you worry your food will run out before you are able to buy more?: 1   Transportation Needs: No Transportation Needs (1/2/2025)    Received from [facility]    Transportation Needs     Does lack of transportation keep you from medical appointments?: 1     Does lack of transportation keep you from work, meetings or getting things that you need?: 1   Physical Activity: Not on file   Stress: Not on file   Social Connections: Socially Integrated (1/2/2025)    Received from [facility] [facility]    Social Connections     Do you often feel lonely or isolated from those around you?: 0   Interpersonal Safety: Not  on file   Housing Stability: Low Risk  (1/2/2025)    Received from [facility] [address] [facility]    Housing Stability     What is your housing situation today?: 1       Medications  Current Facility-Administered Medications   Medication Dose Route Frequency Provider Last Rate Last Admin    levofloxacin (LEVAQUIN) infusion 500 mg  500 mg Intravenous Once Ariel Jenkins  mL/hr at 04/14/25 1101 500 mg at 04/14/25 1101    ondansetron (ZOFRAN) injection 4 mg  4 mg Intravenous Q30 Min PRN Ariel Jenkins MD   4 mg at 04/14/25 0939     Current Outpatient Medications   Medication Sig Dispense Refill    Continuous Blood Gluc Sensor (FREESTYLE HÉCTOR 14 DAY SENSOR) MISC       cyclobenzaprine (FLEXERIL) 5 MG tablet Take 5-10 mg by mouth 3 times daily as needed for muscle spasms.      glucose (BD GLUCOSE) 4 g chewable tablet Chew 3-4 tablets only as needed in case of low blood sugar (<70 mg/dL)      irbesartan (AVAPRO) 150 MG tablet Take 150 mg by mouth daily.      LANTUS SOLOSTAR 100 UNIT/ML soln Inject 60 Units subcutaneously every morning.      metFORMIN (GLUCOPHAGE XR) 500 MG 24 hr tablet Take 2,000 mg by mouth daily (with breakfast).      nabumetone (RELAFEN) 750 MG tablet Take 750 mg by mouth 2 times daily as needed for pain.      rosuvastatin (CRESTOR) 5 MG tablet Take 5 mg by mouth daily.      tiZANidine (ZANAFLEX) 4 MG tablet Take 4 mg by mouth nightly as needed for muscle spasms.         Allergies  No Known Allergies    ROS:  A full 12 point review of systems was taken and is negative aside from what is noted above in the HPI.     PHYSICAL EXAMINATION:  /74   Pulse 83   Temp 99.2  F (37.3  C) (Temporal)   Resp 18   Wt 102.5 kg (226 lb)   SpO2 99%   BMI 33.37 kg/m    General: NAD, alert, cooperative  Head: normocephalic, without abnormality / atraumatic  Abdomen: soft, non tender,  non distended. no suprapubic fullness, no suprapubic tenderness. No bilateral CVA tenderness.    Geniturinary: Penis and scrotum without erythema, edema, tenderness on exam, crepitus, necrotic lesions. No testicular edema or tenderness. Drop of purulent drainage noted at urethra.  Rectal:  deferred   Skin: no rashes or lesions  Musculoskeletal: moves all four extremities equally  Psychological: alert and oriented, answers questions appropriately.      LABS:   Lab Results   Component Value Date    WBC 16.5 (H) 04/14/2025    HGB 14.8 04/14/2025    HCT 41.3 04/14/2025     04/14/2025    ALT 23 04/14/2025    AST 15 04/14/2025     04/14/2025    BUN 16.0 04/14/2025    CO2 26 04/14/2025     Creatinine   Date Value Ref Range Status   04/14/2025 1.04 0.67 - 1.17 mg/dL Final     Lab Results: personally reviewed     IMAGING:  EXAM: CT ABDOMEN PELVIS W CONTRAST  LOCATION: [facility]  DATE: 4/14/2025     INDICATION: SIRS, flank pain, abdominal pain, history of pyelonephritis  COMPARISON: CT AP 8/24/2022  TECHNIQUE: CT scan of the abdomen and pelvis was performed following injection of IV contrast. Multiplanar reformats were obtained. Dose reduction techniques were used.  CONTRAST: IsoVue 370 90mL     FINDINGS:   LOWER CHEST: Calcified granuloma in the left upper lobe. Slightly elevated right hemidiaphragm, unchanged.     HEPATOBILIARY: Diffuse fatty infiltration of the liver.     PANCREAS: Fatty infiltration the gland.     SPLEEN: Normal with incidental splenule.     ADRENAL GLANDS: Normal.     KIDNEYS/BLADDER: Bladder is only mildly distended but the patient has developed circumferential wall thickening and mild enhancement of the bladder mucosa. In addition there are multiple right sided perivesicular nodes, largest measuring 8 mm along the   dome (axial images 227-231, coronal images 44 through 45). Kidneys are unremarkable. No hydronephrosis, calculi or perinephric stranding.     BOWEL: No inflammatory changes. Redundant sigmoid. No diverticuli. Bowel is of normal caliber.      LYMPH NODES: Few less than 1 cm retroperitoneal and bilateral external iliac nodes are present. No concerning adenopathy.     VASCULATURE: Mild arterial calcifications. No aneurysm.     PELVIC ORGANS: Prostate is abnormal. It is enlarged and there are a few, poorly defined hypodense hypodense lesions within it, more extensive in the right lobe than left. This has an aggregate size of the 4 x 3 x 2.4 cm on the right. Seminal vesicles are   normal.     MUSCULOSKELETAL: No concerning bone or soft tissue lesions.                                                                      IMPRESSION:   1.  Abnormal appearing prostate, right lobe more than the left with appearance indicative of prostatitis with abscess. Digital examination should confirm findings.  2.  In addition, changes  indicative of a cystitis with bladder mucosal enhancement, mild circumferential wall thickening due to prostatic enlargement and adjacent perivesicular adenopathy.  3.  Urology consultation and follow-up needed.  4.  Hepatic steatosis.    I have personally reviewed the imaging reports above.       ASSESSMENT/PLAN:  Scot Spaulding is being seen by [facility] for cystitis, prostatitis, prostate abscess    - 64-year-old male with recent diagnosis pyelonephritis, treated with p.o. Ceftin and p.o. Cipro; Admitted with subjective fever, leukocytosis, UTI symptoms, CT 4/14 findings concerning for cystitis, prostatitis and prostate abscess.  - UA concerning for infection, UC and BC pending.  Prior cultures in March grew E. coli, with intermediate sensitivity to Levaquin and ciprofloxacin.  Recommend initiating IV Zosyn, adjusting as needed pending final cultures and sensitivities.  -PVR checks ordered, notify [facility] if greater than or equal to 300 mL.  - Imaging reviewed with Dr. Gamino, recommend transurethral resection of prostate abscesses today. Tentatively scheduled for for 6 PM with Dr. Gamino.  Keep n.p.o. for procedure.  -  Supportive cares including: IV fluids/analgesics/antiemetics and pyretics per primary team  - Old records reviewed -[facility], Care Everywhere    The patient and I discussed treatment options and their alternatives, including the risks and benefits of each. We discussed the importance of treatment and that if left untreated or partially treated prostatic abscesses may fistulize into the urinary bladder, prostatic urethra, rectum, or perineum, or develop into urosepsis. Patient demonstrated understanding. All questions and concerns were answered in detail.    This case was discussed with: Dr Eliud Gamino and Dr. Jenkins    Thank you for consulting [facility] regarding this patient's care. Please contact us with questions or concerns.       Maldonado Canales, APRN, CNP   [facility]  [phone]

## 2025-04-14 NOTE — PLAN OF CARE
Goal Outcome Evaluation:      Plan of Care Reviewed With: patient    Overall Patient Progress: no changeOverall Patient Progress: no change    Assumed cares: [id number]  Vitals: VSS on RA  Pain: Pt denies any pain  Neuro: A&Ox4  Cardiac: WDL  Respiratory: WDL  GI/: Dysuria  Skin: No new skin changes  IV/Drains: R PIV-SL  Activity: Independent  Behavior: Pt is calm and cooperative    Plan of Care: Follow patient plan of care. Pt went to OR at 1745.

## 2025-04-14 NOTE — CONSULTS
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 endplate degenerative change. Electronically Signed by: CORTEZ PIERRE M.D. Signed on: 8/9/2023 11:58 PM Workstation ID: [id number]    MRI ANKLE SANTANAOLT    Result Date: 7/24/2023  Narrative: MRI ANKLE WWO, LT CLINICAL INFORMATION: Female 50 years old Reason: eval for acute osteomyelitis  History: erythema, purulent drainage, hx osteo L ankle TECHNIQUE: MRI of the left ankle performed before and after intravenous injection of 23 cc of Dotarem . COMPARISON: Left ankle radiograph 7/22/2023 FINDINGS: Tendons: Achilles tendon is intact. The flexor digitorum, posterior tibial, and flexor hallucis longus tendon are intact. The peroneus brevis and longus are intact. The anterior ankle tendons are intact. Ligaments: The distal syndesmotic ligaments are intact. The ATFL, PTFL, and calcaneofibular ligaments are intact. Old sprain of the deltoid ligament and the superomedial component of the spring ligament. Articular surface and bone: There is redemonstration of a superficial ulcer adjacent to the lateral malleolus. There is edema and enhancement of the subcutaneous tissues/skin. No abscess is identified. There is chronic appearing cortical thickening and some periosteal  edema which may reflect chronic osteomyelitis. At the fibular tip, there is subtle cortical irregularity and some periosteal enhancement which may reflect early acute osteomyelitis. There are no talar dome osteochondral lesions. Moderate tibiotalar, midfoot, and subtalar osteoarthritis. The sinus tarsus looks intact. The attachment of the plantar fascia is normal. Additional findings: There is soft tissue edema noted within the plantar aspect of the foot. IMPRESSION: Soft tissue infection and ulcer adjacent to the lateral malleolus with findings suggestive of chronic osteomyelitis. Possible early acute  osteomyelitis at the lateral aspect of the fibular tip. Report Electronically Signed: 7/24/2023 11:42 AM Hannah Rodrigues MD I personally reviewed the Images and/or procedure with the Resident/Fellow and agree with this report. Report Electronically Signed: 7/24/2023 12:40 PM Vasquez Good MD    XR Ankle 3 Views Minimum, Lt    Result Date: 7/22/2023  Narrative: XR ANKLE 3 VIEWS MIN, LT CLINICAL INFORMATION: Left ankle wound.  Rule out osteo-. TECHNIQUE: Three views of the left ankle COMPARISON: 10/23/2022 left ankle radiographs; MRI examination from 10/24/2022 FINDINGS: Evaluation is slightly limited due to inability to optimally position the patient.  There is soft tissue swelling, particularly laterally, with a soft tissue wound overlying the lateral malleolus distally.  I see no estephania osteolysis to confirm osteomyelitis of the underlying bone.  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  There are extensive arterial calcifications. IMPRESSION: Soft tissue swelling with wound superficial to the lateral malleolus, but I see no gross osteolysis to confirm osteomyelitis.  If further imaging is clinically warranted, repeat MRI may be considered. Report Electronically Signed: 7/22/2023 10:03 AM Jamal Zamora MD           Thank you very much for the consult.   -------------------------------------------------------------------------------------------------------------------    Kaylen Mcallister MD  Nephrology  [facility]

## 2025-04-14 NOTE — H&P
[facility]    History and Physical - Hospitalist Service       Date of Admission:  4/14/2025    Assessment & Plan      Scot Spaulding is a 64 year old male admitted on 4/14/2025. He has a history of T2DM on insulin and pyelonephritis resistant to outpatient treatment and is admitted for UTI complicated by potential prostatic abscess and prostatitis.     UTI   prostatitis with abscess  Timeline:  2/27/25: C/f pyelo, sepsis, early DKA - rec admission but patient declined. Discharged on cephalosporin (ceftin)  3/2/25: emesis, not tolerating PO abx. 100k pansensitive E coli from prev culture. Discharge on cefuroxime.   3/4/25: Clinic follow up - improved but still frequency  Patient coming in with what sounds like 7 weeks of a smoldering UTI like infection.  he never had urinary symptoms before in his life and then in the middle of February started having urinary frequency and urgency.  This brought him to see the doctor who just diagnosed him with  UTI initially and treated with outpatient antibiotics.  He did not have resolution of symptoms and was diagnosed with pyelonephritis and recommended admission in early March.  Patient declined at that time.  He continued to experience some fatigue and urinary frequency and urgency since then however day prior to admission patient started having profound chills and fatigue and lower left back pain that had him concerned.  So he came to our ER.  On admission he was slightly tacky and was given a liter of normal saline which helped.  His BMP was normal other than a sugar of 300.  His white blood cell showed a neutrophilic predominant leukocytosis to 16.  Urinalysis again shows  signs of infection and is nitrate positive.  CT was obtained which showed signs of cystitis as well as a potential prostatic abscess measuring 4 x 3 x 2.4 cm.  Patient admitted for IV antibiotics and management of prostatic abscess and cystitis.  Urology was consulted in the  ED and they will see patient about a potential procedure, n.p.o. for now.  Initially ED put patient on ceftriaxone but urology advised to switch to Levaquin and that has been changed.  The time of my examination patient was no longer having fevers and chills but he was having those this morning.  Otherwise unremarkable exam.  He is admitted for potential procedure and IV antibiotics.    - Status post 1 L bolus  -N.p.o. for now  - Status post ceftriaxone  - Status post Levaquin, will continue Levaquin 500 mg every 24 hours starting tomorrow  - Urology consult, appreciate their recommendations  - Tylenol as needed  - Patient is optimized for surgery       T2DM  Patient has a history of type 2 diabetes.  He takes metformin thousand twice daily and Lantus 60 units in the morning.  He took his Lantus this morning.  His glucose on admission was 312.  No anion gap and no signs of DKA at this time.  No abdominal pain, nausea, vomiting.  Most recent A1c 11.7 1 month ago.  Poor glycemic control makes him susceptible to infections as above.  - Utilize diabetic order set using n.p.o. status   - I did order full dose Lantus 60 units tomorrow morning with the mindset that he is likely going to be eating   - high dose sliding scale  - Hypoglycemia protocol    Preop clearance: Patient's risk factors include HLD, DM. EKG without signs of ischemia. Patient's functional capacity is Moderate: 4-7 METS: Cycling, flight of stairs, golf, walking 4mph, yard work   Revised Cardiac Risk Index for Pre-Operative Risk  High risk surgery? No  History of ischemic heart disease? No  History of CHF? No  History of cerebrovascular disease? No  Pre-op treatment w/ insulin? Yes  Pre-op creatinine > 2.0? No    1 point = class II risk 0.9% risk of major cardiac event          Diet: NPO for Procedure/Surgery per Anesthesia Guidelines Except for: Meds; Clear liquids before procedure/surgery: ADULT (Age GREATER than or Equal to 18 years) - Clear liquids 2  hours before procedure/surgery    DVT Prophylaxis: Pneumatic Compression Devices  Chang Catheter: Not present  Fluids: S/p 1 L NS, NPO for now  Lines: None     Cardiac Monitoring: None  Code Status: Full Code    Clinically Significant Risk Factors Present on Admission                   # Hypertension: Home medication list includes antihypertensive(s)                      Disposition Plan      Expected Discharge Date: 04/15/2025                The patient's care was discussed with the Attending Physician, Dr. Ritchie .      Abhijit Zaman MD  Hospitalist Service  [facility]  Securely message with [employer] (more info)  Text page via [employer] Paging/Directory   ______________________________________________________________________    Chief Complaint   Chills and fatigue    History obtained from the patient    History of Present Illness   Scot Spaulding is a 64 year old male admitted on 4/14/2025. He has a history of T2DM on insulin and pyelonephritis resistant to outpatient treatment and is admitted for UTI complicated by potential prostatic abscess and prostatitis.     Patient started having urinary issues for the first time in his life in mid to late February.  He went in for evaluation and was diagnosed with a UTI in late February.  His symptoms persisted while on antibiotics after that he was evaluated in early March as well.  The ED recommended admission for suspected pyelonephritis and potentially smoldering DKA.  Patient refused and went on oral antibiotics.  Did seem to have a subjective improvement in illness symptoms, less fatigued.  However, his urinary urgency and frequency continued although at a lesser rate.  He continued to have to wake up multiple times in the evening and being more frequently during the day as well.  However for the next few weeks he did not have any horrible fatigue, fever or chills.    Since then his urinary frequency and urgency has been somewhat stable however  last night he had lower left back pain start and horrible chills along with profound fatigue leaving him much more bedbound than previously.  This is what brought him in this morning.  He continues to feel chills this morning even after starting antibiotics.  He does note that since yesterday has had pain with urination as well specifically at the starting of urination.    On another note, patient has been struggling with posterior left knee pain that initially was attributed to a cyst which was drained and then injected with steroids.  However is now being attributed to a meniscal issue.  Does not seem to be a hospital issue.  Patient works managing a storage facility.    He does not use any recreational drugs and he used to use alcohol but quit years ago.  Activity wise he walks but is limited by his knee pain.  If he did not have his knee pain he believes he would be able to take brisk walks or runs.      Past Medical History    Past Medical History:   Diagnosis Date    Diabetes (H)        Past Surgical History   No past surgical history on file.    Prior to Admission Medications   Prior to Admission Medications   Prescriptions Last Dose Informant Patient Reported? Taking?   Continuous Blood Gluc Sensor (IPS Game FarmersSTYLE HÉCTOR 14 DAY SENSOR) MISC   Yes No   LANTUS SOLOSTAR 100 UNIT/ML soln   Yes No   Sig: Inject 60 Units subcutaneously every morning.   cyclobenzaprine (FLEXERIL) 5 MG tablet   Yes No   Sig: Take 5-10 mg by mouth 3 times daily as needed for muscle spasms.   glucose (BD GLUCOSE) 4 g chewable tablet   Yes No   Sig: Chew 3-4 tablets only as needed in case of low blood sugar (<70 mg/dL)   irbesartan (AVAPRO) 150 MG tablet   Yes No   Sig: Take 150 mg by mouth daily.   metFORMIN (GLUCOPHAGE XR) 500 MG 24 hr tablet   Yes No   Sig: Take 2,000 mg by mouth daily (with breakfast).   nabumetone (RELAFEN) 750 MG tablet   Yes No   Sig: Take 750 mg by mouth 2 times daily as needed for pain.   rosuvastatin (CRESTOR) 5 MG  tablet   Yes No   Sig: Take 5 mg by mouth daily.   tiZANidine (ZANAFLEX) 4 MG tablet   Yes No   Sig: Take 4 mg by mouth nightly as needed for muscle spasms.      Facility-Administered Medications: None           Physical Exam   Vital Signs: Temp: 99.2  F (37.3  C) Temp src: Temporal BP: 137/83 Pulse: 86   Resp: 18 SpO2: 97 % O2 Device: None (Room air)    Weight: 226 lbs 0 oz  Constitutional: awake, alert, cooperative, no apparent distress, and appears stated age  Eyes: Lids and lashes normal, pupils equal, round and reactive to light, extra ocular muscles intact, sclera clear, conjunctiva normal  ENT: Normocephalic, without obvious abnormality, atraumatic, external ears without lesions, oral pharynx with moist mucous membranes, tonsils without erythema or exudates.  Hematologic / Lymphatic: no cervical lymphadenopathy  Respiratory: No increased work of breathing, good air exchange, clear to auscultation bilaterally, no crackles or wheezing  Cardiovascular:  regular rate and rhythm, normal S1 and S2,, and no murmur noted  GI: Soft, non-distended, non-tender, no masses palpated  Skin: no rashes, erythema or lesions  Musculoskeletal: Tender to palpation of left popliteal area. No warmth or redness.  Tone is normal.  Neurologic: Awake, alert, oriented to name, place and time.  Cranial nerves II-XII are grossly intact.   Neuropsychiatric: General: normal, calm and normal eye contact  Level of consciousness: alert / normal  Affect: normal and pleasant      Medical Decision Making       Please see A&P for additional details of medical decision making.      Data   ------------------------- PAST 24 HR DATA REVIEWED -----------------------------------------------    I have personally reviewed the following data over the past 24 hrs:    16.5 (H)  \   14.8   / 260     136 99 16.0 /  312 (H)   4.6 26 1.04 \     ALT: 23 AST: 15 AP: 112 TBILI: 0.9   ALB: 4.4 TOT PROTEIN: 8.1 LIPASE: 25     Procal: N/A CRP: N/A Lactic Acid: 1.5          Imaging results reviewed over the past 24 hrs:   Recent Results (from the past 24 hours)   CT Abdomen Pelvis w Contrast    Narrative    EXAM: CT ABDOMEN PELVIS W CONTRAST  LOCATION: [facility]  DATE: 4/14/2025    INDICATION: SIRS, flank pain, abdominal pain, history of pyelonephritis  COMPARISON: CT AP 8/24/2022  TECHNIQUE: CT scan of the abdomen and pelvis was performed following injection of IV contrast. Multiplanar reformats were obtained. Dose reduction techniques were used.  CONTRAST: IsoVue 370 90mL    FINDINGS:   LOWER CHEST: Calcified granuloma in the left upper lobe. Slightly elevated right hemidiaphragm, unchanged.    HEPATOBILIARY: Diffuse fatty infiltration of the liver.    PANCREAS: Fatty infiltration the gland.    SPLEEN: Normal with incidental splenule.    ADRENAL GLANDS: Normal.    KIDNEYS/BLADDER: Bladder is only mildly distended but the patient has developed circumferential wall thickening and mild enhancement of the bladder mucosa. In addition there are multiple right sided perivesicular nodes, largest measuring 8 mm along the   dome (axial images 227-231, coronal images 44 through 45). Kidneys are unremarkable. No hydronephrosis, calculi or perinephric stranding.    BOWEL: No inflammatory changes. Redundant sigmoid. No diverticuli. Bowel is of normal caliber.    LYMPH NODES: Few less than 1 cm retroperitoneal and bilateral external iliac nodes are present. No concerning adenopathy.    VASCULATURE: Mild arterial calcifications. No aneurysm.    PELVIC ORGANS: Prostate is abnormal. It is enlarged and there are a few, poorly defined hypodense hypodense lesions within it, more extensive in the right lobe than left. This has an aggregate size of the 4 x 3 x 2.4 cm on the right. Seminal vesicles are   normal.    MUSCULOSKELETAL: No concerning bone or soft tissue lesions.      Impression    IMPRESSION:   1.  Abnormal appearing prostate, right lobe more than the left with  appearance indicative of prostatitis with abscess. Digital examination should confirm findings.  2.  In addition, changes  indicative of a cystitis with bladder mucosal enhancement, mild circumferential wall thickening due to prostatic enlargement and adjacent perivesicular adenopathy.  3.  Urology consultation and follow-up needed.  4.  Hepatic steatosis.

## 2025-04-14 NOTE — ANESTHESIA PREPROCEDURE EVALUATION
Anesthesia Pre-Procedure Evaluation    Patient: Scot Spaulding   MRN: [id number] : 1960        Procedure : Procedure(s):  CYSTOSCOPY, WITH TRANSURETHRAL RESECTION OF PROSTATE ABSCESS          Past Medical History:   Diagnosis Date    Diabetes (H)       History reviewed. No pertinent surgical history.   No Known Allergies   Social History     Tobacco Use    Smoking status: Never    Smokeless tobacco: Never   Substance Use Topics    Alcohol use: Not on file      Wt Readings from Last 1 Encounters:   25 102.6 kg (226 lb 3.1 oz)        Anesthesia Evaluation            ROS/MED HX  ENT/Pulmonary:  - neg pulmonary ROS     Neurologic:  - neg neurologic ROS     Cardiovascular:  - neg cardiovascular ROS   (+) Dyslipidemia - -   -  - -                                      METS/Exercise Tolerance: >4 METS    Hematologic:  - neg hematologic  ROS     Musculoskeletal:  - neg musculoskeletal ROS     GI/Hepatic:     (+) GERD,                   Renal/Genitourinary: Comment: + Prostatitis    (+)       Nephrolithiasis ,       Endo:     (+)  type II DM, Last HgA1c: 11,  Using insulin,                 Psychiatric/Substance Use:  - neg psychiatric ROS     Infectious Disease:  - neg infectious disease ROS     Malignancy:  - neg malignancy ROS     Other:  - neg other ROS          Physical Exam    Airway        Mallampati: III   TM distance: > 3 FB   Neck ROM: full   Mouth opening: > 3 cm    Respiratory Devices and Support         Dental       (+) Multiple visibly decayed, broken teeth      Cardiovascular   cardiovascular exam normal          Pulmonary   pulmonary exam normal                OUTSIDE LABS:  CBC:   Lab Results   Component Value Date    WBC 16.5 (H) 2025    WBC 10.5 2025    HGB 14.8 2025    HGB 15.1 2025    HCT 41.3 2025    HCT 44.0 2025     2025     2025     BMP:   Lab Results   Component Value Date     2025     (L) 2025     "POTASSIUM 4.6 04/14/2025    POTASSIUM 4.9 03/02/2025    CHLORIDE 99 04/14/2025    CHLORIDE 97 (L) 03/02/2025    CO2 26 04/14/2025    CO2 25 03/02/2025    BUN 16.0 04/14/2025    BUN 18.6 03/02/2025    CR 1.04 04/14/2025    CR 1.06 03/02/2025     (H) 04/14/2025     (H) 04/14/2025     COAGS: No results found for: \"PTT\", \"INR\", \"FIBR\"  POC: No results found for: \"BGM\", \"HCG\", \"HCGS\"  HEPATIC:   Lab Results   Component Value Date    ALBUMIN 4.4 04/14/2025    PROTTOTAL 8.1 04/14/2025    ALT 23 04/14/2025    AST 15 04/14/2025    ALKPHOS 112 04/14/2025    BILITOTAL 0.9 04/14/2025     OTHER:   Lab Results   Component Value Date    LACT 1.5 04/14/2025    CHANELLE 10.0 04/14/2025    LIPASE 25 04/14/2025    CRP 0.1 02/18/2022    SED 16 12/13/2024       Anesthesia Plan    ASA Status:  3, emergent    NPO Status:  NPO Appropriate    Anesthesia Type: General.     - Airway: ETT   Induction: Propofol, Intravenous.   Maintenance: Balanced.        Consents    Anesthesia Plan(s) and associated risks, benefits, and realistic alternatives discussed. Questions answered and patient/representative(s) expressed understanding.     - Discussed: Risks, Benefits and Alternatives for BOTH SEDATION and the PROCEDURE were discussed     - Discussed with:  Patient       - Patient is DNR/DNI Status: No          Postoperative Care    Pain management: Oral pain medications, IV analgesics.   PONV prophylaxis: Ondansetron (or other 5HT-3)     Comments:               Guerita Diehl MD    Clinically Significant Risk Factors Present on Admission                   # Hypertension: Home medication list includes antihypertensive(s)           # Obesity: Estimated body mass index is 33.4 kg/m  as calculated from the following:    Height as of this encounter: 1.753 m (5' 9\").    Weight as of this encounter: 102.6 kg (226 lb 3.1 oz).                "

## 2025-04-14 NOTE — PROGRESS NOTES
Patient admitted to room 14 at approximately 1629 via wheel chair from emergency room.  Reason for Admission:   Report received from:   Patient was accompanied by Self.  Discharge transportation provided by:  Patient ambulated/transferred:  independently. self.  Patient is alert and orientated x 3.  Outpatient Observation education provided to: (patient, family, friend)  MDRO Education done if applicable (MRSA, VRE, etc)  Safety risks were identified during admission:  none.   Yellow risk/fall band applied:  No  Home meds sent home: No  Home meds sent to pharmacy:No IF YES add 1/2 sheet laminated page reminder to chart/clipboard   Detailed Belongings: clothes, shoes, cell phone

## 2025-04-14 NOTE — PLAN OF CARE
PRIMARY DIAGNOSIS: PYELONEPHRITIS  OUTPATIENT/OBSERVATION GOALS TO BE MET BEFORE DISCHARGE:  ADLs back to baseline: Yes    Activity and level of assistance: Ambulating independently.    Pain status: Improved-controlled with oral pain medications.    Return to near baseline physical activity: Yes     Discharge Planner Nurse   Safe discharge environment identified: Yes  Barriers to discharge: Yes- pt to have procedure later today       Entered by: Juve Duff RN 04/14/2025 4:51 PM     Please review provider order for any additional goals.   Nurse to notify provider when observation goals have been met and patient is ready for discharge.

## 2025-04-14 NOTE — PROGRESS NOTES
Short Stay unit called and informed Pamella that ED handoff note is in pt's chart. Will send patient up when room is clean.

## 2025-04-14 NOTE — PROGRESS NOTES
[facility] ED Handoff Report    ED Chief Complaint: Body aches and fevers    ED Diagnosis:  (N41.2) Prostate abscess  (primary encounter diagnosis)  Comment: Pain currently 4/10. Pt declining any medications at this time.   Plan: Case Request: CYSTOSCOPY, WITH TRANSURETHRAL         RESECTION OF PROSTATE ABSCESS    (N12) Pyelonephritis  Plan: IV abx    (N41.9) Prostatitis, unspecified prostatitis type    PMH:    Past Medical History:   Diagnosis Date    Diabetes (H)         Code Status:  Full Code     Falls Risk: No Band: Not applicable    Current Living Situation/Residence: lives alone     Elimination Status: Continent: Yes     Activity Level: Independent    Patients Preferred Language:  English     Needed: No    Vital Signs:  /63 (BP Location: Left arm)   Pulse 79   Temp 98.3  F (36.8  C) (Oral)   Resp 18   Wt 102.5 kg (226 lb)   SpO2 99%   BMI 33.37 kg/m       Pain Score: 4/10    Is the Patient Confused:  No    Last Food or Drink: 04/14/25 at 1100    Tests Performed: Done: Labs and Imaging    Family Dynamics/Concerns: No    Family Updated On Visitor Policy: N/A    Plan of Care Communicated to Family: N/A    Belongings Sent with Patient: yes    Medications sent with patient: n/a    RN: RIKKI LEIGH RN 4/14/2025 3:00 PM

## 2025-04-14 NOTE — ED PROVIDER NOTES
EMERGENCY DEPARTMENT ENCOUNTER       ED Course & Medical Decision Making     8:12 AM I met with the patient, obtained history, performed an initial exam, and discussed options and plan for diagnostics and treatment here in the ED.    I saw and examined the patient.  He has a history of pyelonephritis and he states this feels similar.  He was tachycardic upon arrival.  He is afebrile now but he does report taking Tylenol prior to arrival and having subjective fevers.  Sepsis is on the differential and after cultures were drawn he is given a liter of fluids and started on Rocephin empirically.  His last urine culture was pansensitive E. coli.    I reviewed his previous admission and workup for pyelonephritis and he did not have any imaging done and this is now becoming a recurrent problem so I added on CT abdomen pelvis with contrast to evaluate his entire collecting system.    This does show evidence for cystitis as well as prostatitis and possible abscess.    I do have a page out to urology for further consultation, however he is doing better, tachycardia is resolving, pain is controlled and subjectively he is feeling better.        I did receive call back from urology and they want to keep him n.p.o. and they will discuss if they will do any surgical management for the possible prostate abscess.     They recommended admission to medicine and starting the patient on Levaquin.  Levaquin has been ordered.    He is reevaluated and he is feeling better, stable, updated and in agreement.  I have a page out to the medicine team at this time and I am awaiting callback.    *Of note, after he already received the earlier antibiotics mentioned above I received a call back from urology that they reviewed his prior sensitivities and he had intermediate sensitivity to quinolones and the requested the Levaquin be stopped and he will be started on Zosyn.  First dose will be given here in the emergency department      Medical  Decision Making  Obtained supplemental history: N/A  Reviewed external records: Visit on 2/27/2025 at [facility] for evaluation of pain.  Care impacted by chronic illness: Diabetes mellitus II  Care significantly affected by social determinants of health:See MDM  Did you consider but not order tests?:See MDM  Did you interpret images independently?:  Any ordered images or EKGs were reviewed and independently interpreted  Consultation discussion with other provider:See MDM  Admit v. Discharge: See MDM        Prior to making a final disposition on this patient the results of patient's tests and other diagnostic studies were discussed with the patient. All questions were answered. Patient expressed understanding of the plan and was amenable to it.    Medications   ondansetron (ZOFRAN) injection 4 mg (4 mg Intravenous $Given 4/14/25 0939)   levofloxacin (LEVAQUIN) infusion 500 mg (has no administration in time range)   sodium chloride 0.9% BOLUS 1,000 mL (1,000 mLs Intravenous $New Bag 4/14/25 0939)   ketorolac (TORADOL) injection 15 mg (15 mg Intravenous $Given 4/14/25 0940)   cefTRIAXone (ROCEPHIN) 1 g vial to attach to  mL bag for ADULTS or NS 50 mL bag for PEDS (0 g Intravenous Stopped 4/14/25 1017)   iopamidol (ISOVUE-370) solution 90 mL (90 mLs Intravenous $Given 4/14/25 0931)       Final Impression     1. Pyelonephritis    2. Prostatitis, unspecified prostatitis type            Chief Complaint     Chief Complaint   Patient presents with    Generalized Body Aches    Headache       He has had generalized body aches fever and headache that started last night. He has trouble urinating.             HPI       Scot Spaulding is a 64 year old male with a pertinent history of diabetes mellitus II, hyperlipidemia, chronic back pain, and hypertension who presents to the ED by walk-in for evaluation generalized body aches and headache.    Last night (4/13/2025), patient developed generalized body  aches, subjective fevers, chills, headache, and dysuria. He took Tylenol last night at 10 PM and this morning at 5 AM with no symptomatic relief. He also endorses constipation and back pain. Patient states his current symptoms feel similar to past UTI episodes, prompting his visit to the ED for further evaluation.     Additionally, patient reports a history of diabetes mellitus II and states his blood sugars have been high lately. No recorded readings were mentioned.     Denies any cough, nausea, or vomiting. No shortness of breath, chest pain, or abdominal pain. Patient is otherwise in his normal state of health with no other concerns.      Per chart review, patient was seen on 2/27/2025 at [facility] for evaluation of pain. On exam, tachycardia and dry lips noted, otherwise unremarkable. Urinalysis was concerning for infection. Labs showed his sugar was 642 and sodium was 127. White count was elevated at 26.6 and lactate was elevated at 2.8. He was given 2 L normal saline, 1 g of IV Rocephin, and 10 units of subcutaneous insulin. Provider felt inpatient admission was likely warranted but patient was against this. He was discharged home in stable condition, and prescribed insulin glargine and Ceftin.     I, Shahla Laurent am serving as a scribe to document services personally performed by Ariel Jenkins M.D. based on my observation and the provider's statements to me. Ariel WADSWORTH M.D attest that Shahla Laurent is acting in a scribe capacity, has observed my performance of the services and has documented them in accordance with my direction.    Past Medical History     Past Medical History:   Diagnosis Date    Diabetes (H)      No past surgical history on file.  No family history on file.   Social History     Tobacco Use    Smoking status: Never    Smokeless tobacco: Never       Relevant past medical, surgical, family and social history as documented above, has been reviewed and  discussed with patient. No changes or additions, unless otherwise noted in the HPI.    Current Medications     Continuous Blood Gluc Sensor (FREESTYLE HÉCTOR 14 DAY SENSOR) MISC  cyclobenzaprine (FLEXERIL) 5 MG tablet  glucose (BD GLUCOSE) 4 g chewable tablet  irbesartan (AVAPRO) 150 MG tablet  LANTUS SOLOSTAR 100 UNIT/ML soln  metFORMIN (GLUCOPHAGE XR) 500 MG 24 hr tablet  nabumetone (RELAFEN) 750 MG tablet  rosuvastatin (CRESTOR) 5 MG tablet  tiZANidine (ZANAFLEX) 4 MG tablet        Allergies     No Known Allergies    Review of Systems     Review of Systems   Constitutional:  Positive for chills and fever.   Respiratory:  Negative for cough and shortness of breath.    Cardiovascular:  Negative for chest pain.   Gastrointestinal:  Positive for constipation. Negative for abdominal pain, nausea and vomiting.   Genitourinary:  Positive for dysuria.   Musculoskeletal:  Positive for back pain.   Skin:  Negative for rash.   Neurological:  Positive for headaches.   All other systems reviewed and are negative.       Remainder of systems reviewed, unless noted in HPI all others negative.    Physical Exam     /74   Pulse 83   Temp 99.2  F (37.3  C) (Temporal)   Resp 18   Wt 102.5 kg (226 lb)   SpO2 99%   BMI 33.37 kg/m      Physical Exam  Vitals and nursing note reviewed.   Constitutional:       General: He is not in acute distress.  HENT:      Head: Normocephalic.      Nose: Nose normal.   Eyes:      General: No scleral icterus.  Cardiovascular:      Rate and Rhythm: Normal rate.   Pulmonary:      Effort: Pulmonary effort is normal.   Abdominal:      Tenderness: There is abdominal tenderness (Suprapubic). There is right CVA tenderness and left CVA tenderness.   Musculoskeletal:      Cervical back: Neck supple.   Skin:     Findings: No rash.   Neurological:      Mental Status: He is alert. Mental status is at baseline.   Psychiatric:         Mood and Affect: Mood normal.             Labs & Imaging         Labs  Ordered and Resulted from Time of ED Arrival to Time of ED Departure   COMPREHENSIVE METABOLIC PANEL - Abnormal       Result Value    Sodium 136      Potassium 4.6      Carbon Dioxide (CO2) 26      Anion Gap 11      Urea Nitrogen 16.0      Creatinine 1.04      GFR Estimate 80      Calcium 10.0      Chloride 99      Glucose 312 (*)     Alkaline Phosphatase 112      AST 15      ALT 23      Protein Total 8.1      Albumin 4.4      Bilirubin Total 0.9     ROUTINE UA WITH MICROSCOPIC REFLEX TO CULTURE - Abnormal    Color Urine Blanca (*)     Appearance Urine Cloudy (*)     Glucose Urine 300 (*)     Bilirubin Urine Negative      Ketones Urine Negative      Specific Gravity Urine 1.024      Blood Urine 0.2 mg/dL (*)     pH Urine 5.5      Protein Albumin Urine 300 (*)     Urobilinogen Urine Normal      Nitrite Urine Positive (*)     Leukocyte Esterase Urine 500 Leobardo/uL (*)     Bacteria Urine Many (*)     WBC Clumps Urine Present (*)     Mucus Urine Present (*)     RBC Urine 0      WBC Urine >182 (*)    CBC WITH PLATELETS AND DIFFERENTIAL - Abnormal    WBC Count 16.5 (*)     RBC Count 4.71      Hemoglobin 14.8      Hematocrit 41.3      MCV 88      MCH 31.4      MCHC 35.8      RDW 12.9      Platelet Count 260      % Neutrophils 73      % Lymphocytes 15      % Monocytes 10      % Eosinophils 1      % Basophils 1      % Immature Granulocytes 1      NRBCs per 100 WBC 0      Absolute Neutrophils 12.1 (*)     Absolute Lymphocytes 2.5      Absolute Monocytes 1.6 (*)     Absolute Eosinophils 0.2      Absolute Basophils 0.1      Absolute Immature Granulocytes 0.1      Absolute NRBCs 0.0     LIPASE - Normal    Lipase 25     LACTIC ACID WHOLE BLOOD WITH 1X REPEAT IN 2 HR WHEN >2 - Normal    Lactic Acid, Initial 1.5     RBC AND PLATELET MORPHOLOGY    RBC Morphology Confirmed RBC Indices      Platelet Assessment        Value: Automated Count Confirmed. Platelet morphology is normal.   BLOOD CULTURE   BLOOD CULTURE   URINE CULTURE          Results for orders placed or performed during the hospital encounter of 04/14/25   CT Abdomen Pelvis w Contrast    Impression    IMPRESSION:   1.  Abnormal appearing prostate, right lobe more than the left with appearance indicative of prostatitis with abscess. Digital examination should confirm findings.  2.  In addition, changes  indicative of a cystitis with bladder mucosal enhancement, mild circumferential wall thickening due to prostatic enlargement and adjacent perivesicular adenopathy.  3.  Urology consultation and follow-up needed.  4.  Hepatic steatosis.   Comprehensive metabolic panel   Result Value Ref Range    Sodium 136 135 - 145 mmol/L    Potassium 4.6 3.4 - 5.3 mmol/L    Carbon Dioxide (CO2) 26 22 - 29 mmol/L    Anion Gap 11 7 - 15 mmol/L    Urea Nitrogen 16.0 8.0 - 23.0 mg/dL    Creatinine 1.04 0.67 - 1.17 mg/dL    GFR Estimate 80 >60 mL/min/1.73m2    Calcium 10.0 8.8 - 10.4 mg/dL    Chloride 99 98 - 107 mmol/L    Glucose 312 (H) 70 - 99 mg/dL    Alkaline Phosphatase 112 40 - 150 U/L    AST 15 0 - 45 U/L    ALT 23 0 - 70 U/L    Protein Total 8.1 6.4 - 8.3 g/dL    Albumin 4.4 3.5 - 5.2 g/dL    Bilirubin Total 0.9 <=1.2 mg/dL   Result Value Ref Range    Lipase 25 13 - 60 U/L   Lactic acid whole blood with 1x repeat in 2 hr when >2   Result Value Ref Range    Lactic Acid, Initial 1.5 0.7 - 2.0 mmol/L   UA with Microscopic reflex to Culture    Specimen: Urine, NOS   Result Value Ref Range    Color Urine Blanca (A) Colorless, Straw, Light Yellow, Yellow    Appearance Urine Cloudy (A) Clear    Glucose Urine 300 (A) Negative mg/dL    Bilirubin Urine Negative Negative    Ketones Urine Negative Negative mg/dL    Specific Gravity Urine 1.024 1.001 - 1.030    Blood Urine 0.2 mg/dL (A) Negative    pH Urine 5.5 5.0 - 7.0    Protein Albumin Urine 300 (A) Negative mg/dL    Urobilinogen Urine Normal Normal mg/dL    Nitrite Urine Positive (A) Negative    Leukocyte Esterase Urine 500 Leobardo/uL (A) Negative     Bacteria Urine Many (A) None Seen /HPF    WBC Clumps Urine Present (A) None Seen /HPF    Mucus Urine Present (A) None Seen /LPF    RBC Urine 0 <=2 /HPF    WBC Urine >182 (H) <=5 /HPF   CBC with platelets and differential   Result Value Ref Range    WBC Count 16.5 (H) 4.0 - 11.0 10e3/uL    RBC Count 4.71 4.40 - 5.90 10e6/uL    Hemoglobin 14.8 13.3 - 17.7 g/dL    Hematocrit 41.3 40.0 - 53.0 %    MCV 88 78 - 100 fL    MCH 31.4 26.5 - 33.0 pg    MCHC 35.8 31.5 - 36.5 g/dL    RDW 12.9 10.0 - 15.0 %    Platelet Count 260 150 - 450 10e3/uL    % Neutrophils 73 %    % Lymphocytes 15 %    % Monocytes 10 %    % Eosinophils 1 %    % Basophils 1 %    % Immature Granulocytes 1 %    NRBCs per 100 WBC 0 <1 /100    Absolute Neutrophils 12.1 (H) 1.6 - 8.3 10e3/uL    Absolute Lymphocytes 2.5 0.8 - 5.3 10e3/uL    Absolute Monocytes 1.6 (H) 0.0 - 1.3 10e3/uL    Absolute Eosinophils 0.2 0.0 - 0.7 10e3/uL    Absolute Basophils 0.1 0.0 - 0.2 10e3/uL    Absolute Immature Granulocytes 0.1 <=0.4 10e3/uL    Absolute NRBCs 0.0 10e3/uL   Extra Blue Top Tube   Result Value Ref Range    Hold Specimen JIC    Extra Red Top Tube   Result Value Ref Range    Hold Specimen JIC    RBC and Platelet Morphology   Result Value Ref Range    RBC Morphology Confirmed RBC Indices     Platelet Assessment  Automated Count Confirmed. Platelet morphology is normal.     Automated Count Confirmed. Platelet morphology is normal.       Ariel Jenkins MD  Emergency Medicine  [facility] EMERGENCY DEPARTMENT  [address] [address] [phone]  4/14/2025         Ariel Jenkins MD  04/14/25 [id number]       Ariel Jenkins MD  04/14/25 [id number]

## 2025-04-15 LAB
ANION GAP SERPL CALCULATED.3IONS-SCNC: 9 MMOL/L (ref 7–15)
BUN SERPL-MCNC: 19.8 MG/DL (ref 8–23)
CALCIUM SERPL-MCNC: 8.8 MG/DL (ref 8.8–10.4)
CHLORIDE SERPL-SCNC: 102 MMOL/L (ref 98–107)
CREAT SERPL-MCNC: 1.08 MG/DL (ref 0.67–1.17)
EGFRCR SERPLBLD CKD-EPI 2021: 77 ML/MIN/1.73M2
ERYTHROCYTE [DISTWIDTH] IN BLOOD BY AUTOMATED COUNT: 13 % (ref 10–15)
GLUCOSE BLDC GLUCOMTR-MCNC: 238 MG/DL (ref 70–99)
GLUCOSE BLDC GLUCOMTR-MCNC: 240 MG/DL (ref 70–99)
GLUCOSE BLDC GLUCOMTR-MCNC: 252 MG/DL (ref 70–99)
GLUCOSE BLDC GLUCOMTR-MCNC: 262 MG/DL (ref 70–99)
GLUCOSE BLDC GLUCOMTR-MCNC: 266 MG/DL (ref 70–99)
GLUCOSE SERPL-MCNC: 274 MG/DL (ref 70–99)
HCO3 SERPL-SCNC: 26 MMOL/L (ref 22–29)
HCT VFR BLD AUTO: 37.2 % (ref 40–53)
HGB BLD-MCNC: 12.4 G/DL (ref 13.3–17.7)
MCH RBC QN AUTO: 30.3 PG (ref 26.5–33)
MCHC RBC AUTO-ENTMCNC: 33.3 G/DL (ref 31.5–36.5)
MCV RBC AUTO: 91 FL (ref 78–100)
PLATELET # BLD AUTO: 222 10E3/UL (ref 150–450)
POTASSIUM SERPL-SCNC: 4.7 MMOL/L (ref 3.4–5.3)
RBC # BLD AUTO: 4.09 10E6/UL (ref 4.4–5.9)
SODIUM SERPL-SCNC: 137 MMOL/L (ref 135–145)
WBC # BLD AUTO: 13.8 10E3/UL (ref 4–11)

## 2025-04-15 PROCEDURE — 80048 BASIC METABOLIC PNL TOTAL CA: CPT | Performed by: UROLOGY

## 2025-04-15 PROCEDURE — 250N000013 HC RX MED GY IP 250 OP 250 PS 637

## 2025-04-15 PROCEDURE — 250N000009 HC RX 250: Performed by: NURSE PRACTITIONER

## 2025-04-15 PROCEDURE — G0378 HOSPITAL OBSERVATION PER HR: HCPCS

## 2025-04-15 PROCEDURE — 85027 COMPLETE CBC AUTOMATED: CPT | Performed by: UROLOGY

## 2025-04-15 PROCEDURE — 258N000001 HC RX 258: Performed by: UROLOGY

## 2025-04-15 PROCEDURE — 82962 GLUCOSE BLOOD TEST: CPT

## 2025-04-15 PROCEDURE — 258N000003 HC RX IP 258 OP 636

## 2025-04-15 PROCEDURE — 250N000013 HC RX MED GY IP 250 OP 250 PS 637: Performed by: UROLOGY

## 2025-04-15 PROCEDURE — 250N000012 HC RX MED GY IP 250 OP 636 PS 637: Performed by: UROLOGY

## 2025-04-15 PROCEDURE — 99232 SBSQ HOSP IP/OBS MODERATE 35: CPT | Mod: GC

## 2025-04-15 PROCEDURE — 250N000011 HC RX IP 250 OP 636

## 2025-04-15 PROCEDURE — 36415 COLL VENOUS BLD VENIPUNCTURE: CPT | Performed by: UROLOGY

## 2025-04-15 PROCEDURE — 120N000001 HC R&B MED SURG/OB

## 2025-04-15 PROCEDURE — 250N000011 HC RX IP 250 OP 636: Performed by: UROLOGY

## 2025-04-15 RX ORDER — GINSENG 100 MG
CAPSULE ORAL 3 TIMES DAILY
Status: DISCONTINUED | OUTPATIENT
Start: 2025-04-15 | End: 2025-04-16 | Stop reason: HOSPADM

## 2025-04-15 RX ORDER — SODIUM CHLORIDE 9 MG/ML
INJECTION, SOLUTION INTRAVENOUS CONTINUOUS
Status: DISCONTINUED | OUTPATIENT
Start: 2025-04-15 | End: 2025-04-16 | Stop reason: HOSPADM

## 2025-04-15 RX ORDER — ENOXAPARIN SODIUM 100 MG/ML
40 INJECTION SUBCUTANEOUS EVERY 24 HOURS
Status: DISCONTINUED | OUTPATIENT
Start: 2025-04-15 | End: 2025-04-16 | Stop reason: HOSPADM

## 2025-04-15 RX ORDER — NICOTINE POLACRILEX 4 MG
15-30 LOZENGE BUCCAL
Status: DISCONTINUED | OUTPATIENT
Start: 2025-04-15 | End: 2025-04-16 | Stop reason: HOSPADM

## 2025-04-15 RX ORDER — DEXTROSE MONOHYDRATE 25 G/50ML
25-50 INJECTION, SOLUTION INTRAVENOUS
Status: DISCONTINUED | OUTPATIENT
Start: 2025-04-15 | End: 2025-04-16 | Stop reason: HOSPADM

## 2025-04-15 RX ADMIN — INSULIN ASPART 5 UNITS: 100 INJECTION, SOLUTION INTRAVENOUS; SUBCUTANEOUS at 08:05

## 2025-04-15 RX ADMIN — SODIUM CHLORIDE FOR IRRIGATION 3000 ML: 0.9 SOLUTION IRRIGATION at 01:40

## 2025-04-15 RX ADMIN — SODIUM CHLORIDE: 0.9 INJECTION, SOLUTION INTRAVENOUS at 10:38

## 2025-04-15 RX ADMIN — SODIUM CHLORIDE: 0.9 INJECTION, SOLUTION INTRAVENOUS at 01:36

## 2025-04-15 RX ADMIN — BACITRACIN: 500 OINTMENT TOPICAL at 15:57

## 2025-04-15 RX ADMIN — BENZOCAINE 6 MG-MENTHOL 10 MG LOZENGES 1 LOZENGE: at 13:00

## 2025-04-15 RX ADMIN — INSULIN ASPART 5 UNITS: 100 INJECTION, SOLUTION INTRAVENOUS; SUBCUTANEOUS at 22:20

## 2025-04-15 RX ADMIN — PIPERACILLIN AND TAZOBACTAM 3.38 G: 3; .375 INJECTION, POWDER, FOR SOLUTION INTRAVENOUS at 01:36

## 2025-04-15 RX ADMIN — INSULIN GLARGINE 60 UNITS: 100 INJECTION, SOLUTION SUBCUTANEOUS at 08:06

## 2025-04-15 RX ADMIN — INSULIN ASPART 4 UNITS: 100 INJECTION, SOLUTION INTRAVENOUS; SUBCUTANEOUS at 04:13

## 2025-04-15 RX ADMIN — INSULIN ASPART 6 UNITS: 100 INJECTION, SOLUTION INTRAVENOUS; SUBCUTANEOUS at 18:00

## 2025-04-15 RX ADMIN — PIPERACILLIN AND TAZOBACTAM 3.38 G: 3; .375 INJECTION, POWDER, FOR SOLUTION INTRAVENOUS at 10:28

## 2025-04-15 RX ADMIN — SODIUM CHLORIDE FOR IRRIGATION: 0.9 SOLUTION IRRIGATION at 07:23

## 2025-04-15 RX ADMIN — INSULIN ASPART 5 UNITS: 100 INJECTION, SOLUTION INTRAVENOUS; SUBCUTANEOUS at 12:17

## 2025-04-15 RX ADMIN — PIPERACILLIN AND TAZOBACTAM 3.38 G: 3; .375 INJECTION, POWDER, FOR SOLUTION INTRAVENOUS at 17:15

## 2025-04-15 RX ADMIN — BACITRACIN: 500 OINTMENT TOPICAL at 22:16

## 2025-04-15 RX ADMIN — SODIUM CHLORIDE: 0.9 INJECTION, SOLUTION INTRAVENOUS at 20:08

## 2025-04-15 RX ADMIN — SODIUM CHLORIDE FOR IRRIGATION 6000 ML: 0.9 SOLUTION IRRIGATION at 01:38

## 2025-04-15 RX ADMIN — BENZOCAINE 6 MG-MENTHOL 10 MG LOZENGES 1 LOZENGE: at 22:19

## 2025-04-15 RX ADMIN — ACETAMINOPHEN 650 MG: 325 TABLET, FILM COATED ORAL at 17:14

## 2025-04-15 RX ADMIN — ENOXAPARIN SODIUM 40 MG: 40 INJECTION SUBCUTANEOUS at 12:12

## 2025-04-15 RX ADMIN — SODIUM CHLORIDE FOR IRRIGATION 3000 ML: 0.9 SOLUTION IRRIGATION at 03:39

## 2025-04-15 RX ADMIN — SODIUM CHLORIDE FOR IRRIGATION: 0.9 SOLUTION IRRIGATION at 04:57

## 2025-04-15 RX ADMIN — SODIUM CHLORIDE FOR IRRIGATION: 0.9 SOLUTION IRRIGATION at 07:22

## 2025-04-15 RX ADMIN — BENZOCAINE 6 MG-MENTHOL 10 MG LOZENGES 1 LOZENGE: at 22:21

## 2025-04-15 ASSESSMENT — ACTIVITIES OF DAILY LIVING (ADL)
ADLS_ACUITY_SCORE: 20
ADLS_ACUITY_SCORE: 20
ADLS_ACUITY_SCORE: 23
ADLS_ACUITY_SCORE: 21
ADLS_ACUITY_SCORE: 20
ADLS_ACUITY_SCORE: 20
ADLS_ACUITY_SCORE: 21
ADLS_ACUITY_SCORE: 20
ADLS_ACUITY_SCORE: 21
ADLS_ACUITY_SCORE: 21
ADLS_ACUITY_SCORE: 23
ADLS_ACUITY_SCORE: 23
ADLS_ACUITY_SCORE: 21
ADLS_ACUITY_SCORE: 20
ADLS_ACUITY_SCORE: 21
ADLS_ACUITY_SCORE: 21

## 2025-04-15 NOTE — ANESTHESIA POSTPROCEDURE EVALUATION
Patient: Scot Spaulding    Procedure: Procedure(s):  CYSTOSCOPY, WITH TRANSURETHRAL RESECTION OF PROSTATE ABSCESS       Anesthesia Type:  General    Note:  Disposition: Inpatient   Postop Pain Control: Uneventful            Sign Out: Well controlled pain   PONV: No   Neuro/Psych: Uneventful            Sign Out: Acceptable/Baseline neuro status   Airway/Respiratory: Uneventful            Sign Out: Acceptable/Baseline resp. status   CV/Hemodynamics: Uneventful            Sign Out: Acceptable CV status; No obvious hypovolemia; No obvious fluid overload   Other NRE: NONE   DID A NON-ROUTINE EVENT OCCUR? No           Last vitals:  Vitals Value Taken Time   /70 04/14/25 2130   Temp 36.4  C (97.6  F) 04/14/25 2128   Pulse 90 04/14/25 2136   Resp 22 04/14/25 2136   SpO2 98 % 04/14/25 2136   Vitals shown include unfiled device data.    Electronically Signed By: Guerita Diehl MD  April 14, 2025  9:37 PM

## 2025-04-15 NOTE — OP NOTE
Date of Surgery: April 14, 2025     Location of Service: [facility]     Pre-operative Diagnosis:  Prostatic abscesses     Post-operative Diagnosis:  Prostatic abscesses     Procedure:  Cystoscopy  Transurethral resection of prostatic abscesses     Surgeon: Eliud Gamino MD, FACS     Estimated blood loss: 100 mL     Complications: None     Specimens:   Prostate chips     Drains:  22 Norwegian 3-way urethral Chang catheter     Findings:  Normal anterior and posterior urethra.  Trilobar hyperplasia with obstruction.  Multiple abscesses within the transition zone bilaterally. Largest abscess on the right in the mid/apical gland transition zone.  Trabeculated bladder without masses or tumors.  Ureteral orifices orthotopic in position.     Summary of Procedure:  After appropriate informed consent was obtained the patient was brought to the operating theater. He was laid in the supine position. General anesthesia was induced. Perioperative antibiotics were administered. The patient was then placed into the dorsal lithotomy position and prepped and draped in the usual sterile fashion. A time out was performed.     A 22 Norwegian rigid cystoscope with a 30 degree lens was inserted into the urethra and carried into the urinary bladder. The anterior and posterior urethra were normal. The prostate showed trilobar hyperplasia. The bladder was inspected with both a 30 degree and 70 degree lens. The ureteral orifices were orthotopic in position. There were no tumors. The bladder was trabeculated.     I then withdrew the cystoscope and inserted a 26 Norwegian resectoscope utilizing a visual obturator and switched to a bi-polar cautery loop.     I proceeded to resect the transition zone laterally in the locations the abscesses were indicated on imaging. I found about four small abscesses on the right and a large abscess at the mid gland/apex. I found three similar abscesses on the left. After I was assured that all  abscesses noted on imaging had been drained I established hemostasis. The chips were irrigated free.     A 22 Arabic 3-way Chang catheter was placed and 30 mL of sterile water placed into the balloon. This irrigated well and almost completely clear.     The patient was awoken from general anesthesia and brought to the recovery room in satisfactory condition.      Eliud Gamino MD, FACS

## 2025-04-15 NOTE — PROGRESS NOTES
"PRIMARY DIAGNOSIS: \"GENERIC\" NURSING  OUTPATIENT/OBSERVATION GOALS TO BE MET BEFORE DISCHARGE:  ADLs back to baseline: Yes    Activity and level of assistance: Up with standby assistance.    Pain status: Pain free.    Return to near baseline physical activity: Yes     Discharge Planner Nurse   Safe discharge environment identified: Yes  Barriers to discharge: Yes       Entered by: Luis Bates RN 04/15/2025 12:08 AM     Please review provider order for any additional goals.   Nurse to notify provider when observation goals have been met and patient is ready for discharge.  "

## 2025-04-15 NOTE — PROGRESS NOTES
[facility]    Progress Note - Hospitalist Service       Date of Admission:  4/14/2025    Assessment & Plan   Scot Spaulding is a 64 year old male admitted on 4/14/2025. He has a history of T2DM on insulin and pyelonephritis resistant to outpatient treatment and is admitted for UTI complicated by potential prostatic abscess and prostatitis. Now s/p I&D with urology and of Zosyn.      UTI   prostatitis with abscess  Timeline:  2/27/25: C/f pyelo, sepsis, early DKA - rec admission but patient declined. Discharged on cephalosporin (ceftin)  3/2/25: emesis, not tolerating PO abx. 100k pansensitive E coli from prev culture. Discharge on cefuroxime.   3/4/25: Clinic follow up - improved but still frequency  Patient coming in with what sounds like 7 weeks of a smoldering UTI like infection.  he never had urinary symptoms before in his life and then in the middle of February started having urinary frequency and urgency.  This brought him to see the doctor who just diagnosed him with  UTI initially and treated with outpatient antibiotics.  He did not have resolution of symptoms and was diagnosed with pyelonephritis and recommended admission in early March.  Patient declined at that time.  He continued to experience some fatigue and urinary frequency and urgency since then however day prior to admission patient started having profound chills and fatigue and lower left back pain that had him concerned.  So he came to our ER.  On admission he was slightly tacky and was given a liter of normal saline which helped.  His BMP was normal other than a sugar of 300.  His white blood cell showed a neutrophilic predominant leukocytosis to 16.  Urinalysis again shows  signs of infection and is nitrate positive.  CT was obtained which showed signs of cystitis as well as a potential prostatic abscess measuring 4 x 3 x 2.4 cm.  Patient admitted for IV antibiotics and management of prostatic abscess and cystitis.   "Initially ED put patient on ceftriaxone but urology advised to switch to Levaquin. As of 4/15 the patient is on Zosyn. Now status post I&D of prostatic abscess. Patient clinically improved since admission. Catheter draining hematuria with clots.   - Urology consult, appreciate their recommendations  - s/p I&D of prostatic abscess.   - Urine and blood cultures pending   - Continue Zosyn   - Tylenol as needed     T2DM  Patient has a history of type 2 diabetes.  He takes metformin thousand twice daily and Lantus 60 units in the morning.  He took his Lantus this morning.  His glucose on admission was 312. No anion gap and no signs of DKA at this time.  No abdominal pain, nausea, vomiting.  Most recent A1c 11.7 1 month ago.  Poor glycemic control makes him susceptible to infections as above. Blood glucose remains elevated >250 likely in the setting of intraoperative steroids. Will add mealtime insulin.   - Lantus 60 unit(s) qam   - Start mealtime Novolog 1:10 carb ratio   - high dose sliding scale                            - Hypoglycemia protocol         Diet: Low Consistent Carb (45 g CHO per Meal) Diet    DVT Prophylaxis: Enoxaparin (Lovenox) SQ  Chang Catheter: PRESENT, indication: Surgical procedure  Fluids: PO   Lines: None     Cardiac Monitoring: None  Code Status: Full Code      Clinically Significant Risk Factors Present on Admission                   # Hypertension: Home medication list includes antihypertensive(s)           # Obesity: Estimated body mass index is 33.79 kg/m  as calculated from the following:    Height as of this encounter: 1.753 m (5' 9\").    Weight as of this encounter: 103.8 kg (228 lb 13.4 oz).              Social Drivers of Health   Depression: At risk (3/4/2025)    Received from [facility]     PHQ-2     PHQ2 Total: 6   Tobacco Use: Low Risk  (4/14/2025)    Patient History     Smoking Tobacco Use: Never     Smokeless Tobacco Use: Never   Recent Concern: Tobacco Use - Medium Risk " (4/8/2025)    [facility]    Patient History     Smoking Tobacco Use: Former     Smokeless Tobacco Use: Never         Disposition Plan     Medically Ready for Discharge: Anticipated Tomorrow         The patient's care was discussed with the Attending Physician, Dr. Ritchie .    Kate Lyle DO  Hospitalist Service  [facility]  Securely message with [employer] (more info)  Text page via [facility] Paging/Directory   ______________________________________________________________________    Interval History   Patient reporting that he is feeling well today. Denies fever, chills, abdominal pain, dysuria, CP, or SOB. Patient stating that he had some clots in his puentes this morning and required irrigation this morning which improved drainage. No specific concerns otherwise this morning.     Patient curious about discharge planning. Informed patient that this will be guided by antibiotic needs and urology recommendations.     Physical Exam   Vital Signs: Temp: 97.7  F (36.5  C) Temp src: Oral BP: 121/65 Pulse: 78   Resp: 14 SpO2: 96 % O2 Device: None (Room air) Oxygen Delivery: 2 LPM  Weight: 228 lbs 13.4 oz    Physical Exam  Cardiovascular:      Rate and Rhythm: Normal rate and regular rhythm.      Pulses: Normal pulses.      Heart sounds: Normal heart sounds. No murmur heard.     No friction rub. No gallop.   Pulmonary:      Effort: Pulmonary effort is normal.      Breath sounds: Normal breath sounds.   Abdominal:      General: Abdomen is flat. Bowel sounds are normal. There is no distension.      Palpations: Abdomen is soft.      Tenderness: There is no abdominal tenderness.   Genitourinary:     Comments: Puentes draining bloody urine with some small visible clots and mucus.   Skin:     General: Skin is warm and dry.   Neurological:      General: No focal deficit present.      Mental Status: He is alert.       Medical Decision Making             Data      I have personally reviewed the following data over the past 24 hrs:    13.8 (H)  \   12.4 (L)   / 222     137 102 19.8 /  274 (H)   4.7 26 1.08 \       Imaging results reviewed over the past 24 hrs:   No results found for this or any previous visit (from the past 24 hours).

## 2025-04-15 NOTE — PLAN OF CARE
Problem: Surgery Nonspecified  Goal: Absence of Infection Signs and Symptoms  Outcome: Progressing  Goal: Anesthesia/Sedation Recovery  Outcome: Progressing  Intervention: Optimize Anesthesia Recovery  Recent Flowsheet Documentation  Taken 4/15/2025 0344 by Luis Bates, RN  Safety Promotion/Fall Prevention:   assistive device/personal items within reach   clutter free environment maintained   nonskid shoes/slippers when out of bed   room near nurse's station   room organization consistent   safety round/check completed  Level Incentive Spirometer (mL): 3500  Taken 4/14/2025 2353 by Luis Bates, RN  Safety Promotion/Fall Prevention:   assistive device/personal items within reach   clutter free environment maintained   nonskid shoes/slippers when out of bed   room near nurse's station   room organization consistent   safety round/check completed  Level Incentive Spirometer (mL): 3500  Goal: Nausea and Vomiting Relief  Outcome: Progressing     Goal Outcome Evaluation:  CBI at fast rate with pink/red output, cleared up throughout night. Ambulated in halls once. Manually irrigated puentes twice this shift with approximately 9 clots total.     Luis Bates RN  [phone]

## 2025-04-15 NOTE — PROGRESS NOTES
"  [facility] - POSTOP PROGRESS NOTE    PLACE OF SERVICE:  [facility]     SURGERY: POD #1 after cystoscopy, transurethral resection of prostate abscesses by  Dr. Gamino  for prostatic abscesses     SUBJECTIVE:   Events: no acute events overnight. RN reports patient required manual irrigation overnight d/t obstructing clots. CBI slow this AM with urine clear to light yellow. Denies abdominal and bilateral flank pain, scrotal/penile pain, fever, chills.     OBJECTIVE:  PHYSICAL EXAM  Vital signs:  Temp: 97.7  F (36.5  C) Temp src: Oral BP: 121/65 Pulse: 78   Resp: 14 SpO2: 96 % O2 Device: None (Room air) Oxygen Delivery: 2 LPM Height: 175.3 cm (5' 9\") Weight: 103.8 kg (228 lb 13.4 oz)  Estimated body mass index is 33.79 kg/m  as calculated from the following:    Height as of this encounter: 1.753 m (5' 9\").    Weight as of this encounter: 103.8 kg (228 lb 13.4 oz).    General: NAD, alert, cooperative  Abdomen: Soft, non-tender, non-distended. No SP fullness or tenderness.   : Penis and scrotum without erythema, edema, tenderness, crepitus, necrotic lesions. Chang draining clear to light yellow colored urine. CBI off at 1150.     LABS:  No results found for: \"INR\"   Lab Results   Component Value Date    WBC 13.8 04/15/2025     Lab Results   Component Value Date    HGB 12.4 04/15/2025     Lab Results   Component Value Date     04/15/2025     Creatinine   Date Value Ref Range Status   04/15/2025 1.08 0.67 - 1.17 mg/dL Final     Sodium   Date Value Ref Range Status   04/15/2025 137 135 - 145 mmol/L Final     Potassium   Date Value Ref Range Status   04/15/2025 4.7 3.4 - 5.3 mmol/L Final   08/24/2022 4.4 3.5 - 5.0 mmol/L Final     No results found for: \"MAG\"    UA RESULTS:  Recent Labs   Lab Test 04/14/25  0827   COLOR Blanca*   APPEARANCE Cloudy*   URINEGLC 300*   URINEBILI Negative   URINEKETONE Negative   SG 1.024   UBLD 0.2 mg/dL*   URINEPH 5.5   PROTEIN 300*   NITRITE Positive*   LEUKEST 500 " Leobardo/uL*   RBCU 0   WBCU >182*     Urine culture 4/14, prelim: >100k E.coli    BC 4/14: NGTD    Lab Results: personally reviewed.     ASSESSMENT/PLAN:  POD #1 after cystoscopy, transurethral resection of prostate abscesses by Dr. Gamino for prostatic abscesses    - CBI off at 1150. Manually irrigate prn. If hematuria >/= Gr IV, then manually irrigate and resume CBI at moderate rate. If urine remains Gr III or lighter, ok to cap irrigation port at 1350.   -  UC prelim: E.coli. BC: NGTD. WBC improving, 13.8. Afebrile. Prior cultures in March grew E. coli, with intermediate sensitivity to Levaquin and ciprofloxacin. Continue IV Zosyn, adjust as needed pending final cultures/sensitivities. Recommend ID consult.   - Creatinine/GFR WNL.   - Maintain puentes x 1 week from 4/14/25.   - Plan to repeat CT prior to discharge or sooner if clinical improvement ceases.   - Surgical pathology pending.   - Will continue to follow.     Discussed with: Dr. Stevenson Canales, APRN, CNP  [facility]   [phone]

## 2025-04-15 NOTE — PROGRESS NOTES
"PRIMARY DIAGNOSIS: \"GENERIC\" NURSING  OUTPATIENT/OBSERVATION GOALS TO BE MET BEFORE DISCHARGE:  ADLs back to baseline: Yes    Activity and level of assistance: Up with standby assistance.    Pain status: Pain free.    Return to near baseline physical activity: Yes     Discharge Planner Nurse   Safe discharge environment identified: Yes  Barriers to discharge: Yes       Entered by: Luis Bates RN 04/15/2025 3:44 AM     Please review provider order for any additional goals.   Nurse to notify provider when observation goals have been met and patient is ready for discharge.  "

## 2025-04-15 NOTE — ANESTHESIA PROCEDURE NOTES
Airway       Patient location during procedure: OR       Procedure Start/Stop Times: 4/14/2025 7:35 PM  Staff -        CRNA: Abhijit Amin       Performed By: CRNA  Consent for Airway        Urgency: elective  Indications and Patient Condition       Indications for airway management: darlin-procedural       Induction type:intravenous       Mask difficulty assessment: 1 - vent by mask    Final Airway Details       Final airway type: endotracheal airway       Successful airway: ETT - single  Endotracheal Airway Details        ETT size (mm): 7.5       Cuffed: yes       Successful intubation technique: video laryngoscopy       VL Blade Size: Glidescope 4       Grade View of Cords: 1       Adjucts: stylet       Position: Right       Measured from: lips       Bite block used: None    Post intubation assessment        Placement verified by: capnometry, equal breath sounds and chest rise        Number of attempts at approach: 1       Number of other approaches attempted: 0       Secured with: tape       Ease of procedure: easy       Dentition: Intact    Medication(s) Administered   Medication Administration Time: 4/14/2025 7:35 PM

## 2025-04-16 ENCOUNTER — APPOINTMENT (OUTPATIENT)
Dept: ULTRASOUND IMAGING | Facility: HOSPITAL | Age: 65
End: 2025-04-16
Payer: COMMERCIAL

## 2025-04-16 VITALS
HEART RATE: 76 BPM | WEIGHT: 228.84 LBS | HEIGHT: 69 IN | RESPIRATION RATE: 18 BRPM | TEMPERATURE: 97.7 F | OXYGEN SATURATION: 95 % | BODY MASS INDEX: 33.89 KG/M2 | DIASTOLIC BLOOD PRESSURE: 70 MMHG | SYSTOLIC BLOOD PRESSURE: 134 MMHG

## 2025-04-16 LAB
ANION GAP SERPL CALCULATED.3IONS-SCNC: 7 MMOL/L (ref 7–15)
ATRIAL RATE - MUSE: 84 BPM
BACTERIA UR CULT: ABNORMAL
BACTERIA UR CULT: ABNORMAL
BUN SERPL-MCNC: 15.7 MG/DL (ref 8–23)
CALCIUM SERPL-MCNC: 9.1 MG/DL (ref 8.8–10.4)
CHLORIDE SERPL-SCNC: 109 MMOL/L (ref 98–107)
CREAT SERPL-MCNC: 1.05 MG/DL (ref 0.67–1.17)
DIASTOLIC BLOOD PRESSURE - MUSE: 80 MMHG
EGFRCR SERPLBLD CKD-EPI 2021: 79 ML/MIN/1.73M2
ERYTHROCYTE [DISTWIDTH] IN BLOOD BY AUTOMATED COUNT: 13.2 % (ref 10–15)
GLUCOSE BLDC GLUCOMTR-MCNC: 121 MG/DL (ref 70–99)
GLUCOSE BLDC GLUCOMTR-MCNC: 149 MG/DL (ref 70–99)
GLUCOSE BLDC GLUCOMTR-MCNC: 165 MG/DL (ref 70–99)
GLUCOSE BLDC GLUCOMTR-MCNC: 171 MG/DL (ref 70–99)
GLUCOSE SERPL-MCNC: 133 MG/DL (ref 70–99)
HCO3 SERPL-SCNC: 25 MMOL/L (ref 22–29)
HCT VFR BLD AUTO: 34.2 % (ref 40–53)
HGB BLD-MCNC: 11.6 G/DL (ref 13.3–17.7)
INTERPRETATION ECG - MUSE: NORMAL
MCH RBC QN AUTO: 30.9 PG (ref 26.5–33)
MCHC RBC AUTO-ENTMCNC: 33.9 G/DL (ref 31.5–36.5)
MCV RBC AUTO: 91 FL (ref 78–100)
P AXIS - MUSE: 58 DEGREES
PLATELET # BLD AUTO: 222 10E3/UL (ref 150–450)
POTASSIUM SERPL-SCNC: 4.2 MMOL/L (ref 3.4–5.3)
PR INTERVAL - MUSE: 156 MS
QRS DURATION - MUSE: 82 MS
QT - MUSE: 350 MS
QTC - MUSE: 413 MS
R AXIS - MUSE: 48 DEGREES
RBC # BLD AUTO: 3.75 10E6/UL (ref 4.4–5.9)
SODIUM SERPL-SCNC: 141 MMOL/L (ref 135–145)
SYSTOLIC BLOOD PRESSURE - MUSE: 138 MMHG
T AXIS - MUSE: 57 DEGREES
URATE SERPL-MCNC: 3 MG/DL (ref 3.4–7)
VENTRICULAR RATE- MUSE: 84 BPM
WBC # BLD AUTO: 10 10E3/UL (ref 4–11)

## 2025-04-16 PROCEDURE — 84550 ASSAY OF BLOOD/URIC ACID: CPT

## 2025-04-16 PROCEDURE — 36415 COLL VENOUS BLD VENIPUNCTURE: CPT | Performed by: UROLOGY

## 2025-04-16 PROCEDURE — 250N000011 HC RX IP 250 OP 636: Performed by: UROLOGY

## 2025-04-16 PROCEDURE — 85041 AUTOMATED RBC COUNT: CPT | Performed by: UROLOGY

## 2025-04-16 PROCEDURE — 80048 BASIC METABOLIC PNL TOTAL CA: CPT | Performed by: UROLOGY

## 2025-04-16 PROCEDURE — 85014 HEMATOCRIT: CPT | Performed by: UROLOGY

## 2025-04-16 PROCEDURE — 93970 EXTREMITY STUDY: CPT

## 2025-04-16 PROCEDURE — 250N000011 HC RX IP 250 OP 636

## 2025-04-16 PROCEDURE — 99238 HOSP IP/OBS DSCHRG MGMT 30/<: CPT | Mod: GC

## 2025-04-16 RX ORDER — PIPERACILLIN SODIUM, TAZOBACTAM SODIUM 4; .5 G/20ML; G/20ML
4.5 INJECTION, POWDER, LYOPHILIZED, FOR SOLUTION INTRAVENOUS EVERY 6 HOURS
Status: DISCONTINUED | OUTPATIENT
Start: 2025-04-16 | End: 2025-04-16 | Stop reason: HOSPADM

## 2025-04-16 RX ORDER — INSULIN GLARGINE 100 [IU]/ML
45 INJECTION, SOLUTION SUBCUTANEOUS EVERY MORNING
Qty: 15 ML | Refills: 0 | Status: SHIPPED | OUTPATIENT
Start: 2025-04-16

## 2025-04-16 RX ADMIN — PIPERACILLIN AND TAZOBACTAM 3.38 G: 3; .375 INJECTION, POWDER, FOR SOLUTION INTRAVENOUS at 01:37

## 2025-04-16 RX ADMIN — INSULIN ASPART 1 UNITS: 100 INJECTION, SOLUTION INTRAVENOUS; SUBCUTANEOUS at 05:01

## 2025-04-16 RX ADMIN — BACITRACIN 1 APPLICATION.: 500 OINTMENT TOPICAL at 09:10

## 2025-04-16 RX ADMIN — INSULIN ASPART 2 UNITS: 100 INJECTION, SOLUTION INTRAVENOUS; SUBCUTANEOUS at 13:18

## 2025-04-16 RX ADMIN — BACITRACIN 1 APPLICATION.: 500 OINTMENT TOPICAL at 13:17

## 2025-04-16 RX ADMIN — ENOXAPARIN SODIUM 40 MG: 40 INJECTION SUBCUTANEOUS at 10:46

## 2025-04-16 RX ADMIN — INSULIN ASPART 2 UNITS: 100 INJECTION, SOLUTION INTRAVENOUS; SUBCUTANEOUS at 01:37

## 2025-04-16 RX ADMIN — INSULIN GLARGINE 60 UNITS: 100 INJECTION, SOLUTION SUBCUTANEOUS at 09:06

## 2025-04-16 RX ADMIN — PIPERACILLIN AND TAZOBACTAM 4.5 G: 4; .5 INJECTION, POWDER, FOR SOLUTION INTRAVENOUS at 10:46

## 2025-04-16 ASSESSMENT — ACTIVITIES OF DAILY LIVING (ADL)
ADLS_ACUITY_SCORE: 23

## 2025-04-16 NOTE — PROGRESS NOTES
"8:59 PM    Initially paged early in the evening about left shin pain.  On initial H&P noted to have left knee pain.  Reported bleeding attributed to cyst that was drained and injected with steroids per patient.  However later attributed to meniscal issue.    Again paged by nurse about increased swelling of the left shin and ankle. Evaluated patient at bedside.    Patient reports a few hours of increased pain and swelling of the left ankle.  Notes has had a history of left knee pain though denies any history of left ankle pain.  Rates pain as 3 out of 10 constant and sharp in nature.  Denies any injuries or trauma.  Says was told to have left knee reevaluated in June or July with consideration of repeat steroid injection.  Reports drainage of effusion of 31 mL of fluid from the left knee in March.     /59 (BP Location: Right arm)   Pulse 82   Temp 97.8  F (36.6  C) (Oral)   Resp 16   Ht 1.753 m (5' 9\")   Wt 103.8 kg (228 lb 13.4 oz)   SpO2 95%   BMI 33.79 kg/m      Constitutional: awake, alert, cooperative, no apparent distress  Musculoskeletal: L>R nonpitting edema of ankle and tenderness to palpation. No erythema, ulcerations, or lesions. No palpable tophi. Normal ROM and strength.     Differential includes DVT with recent surgery for prostate abscesses, gout (no known history of), osteoarthritis, dependent edema/poor venous return, cellulitis.     Plan:  - DVT US  - elevate leg   - heat, ice, Tylenol prn pain   - uric acid in AM    Katina Ding MD    "

## 2025-04-16 NOTE — PLAN OF CARE
Problem: Adult Inpatient Plan of Care  Goal: Plan of Care Review  Description: The Plan of Care Review/Shift note should be completed every shift.  The Outcome Evaluation is a brief statement about your assessment that the patient is improving, declining, or no change.  This information will be displayed automatically on your shiftnote.  Outcome: Progressing  Flowsheets (Taken 4/16/2025 0513)  Plan of Care Reviewed With: patient  Overall Patient Progress: improving   Goal Outcome Evaluation:      Plan of Care Reviewed With: patient    Overall Patient Progress: improvingOverall Patient Progress: improving         Assumed care for this patient on 4/15/25 at 2300.  Patient admitted inpatient with pyelonephritis.  POD 2. Cystoscopy, with transurethral resection of prostate abscess.  CBI clamped yesterday at 1150. Chang irrigate one time this shift. Output light pink.  Patient denies any abdominal discomfort.

## 2025-04-16 NOTE — PROGRESS NOTES
US obtained for left ankle swelling. Negative for DVT. Agree with plan to add on uric acid in AM to investigate gout as underlying etiology.     Veena Rodrigues MD PGY-3  [facility]

## 2025-04-16 NOTE — PLAN OF CARE
Shift from 0700 to 1930-      Problem: Surgery Nonspecified  Goal: Absence of Bleeding  Outcome: Progressing     Problem: Surgery Nonspecified  Goal: Optimal Pain Control and Function  Outcome: Progressing  Intervention: Prevent or Manage Pain  Recent Flowsheet Documentation  Taken 4/15/2025 1800 by Franchesca Salinas RN  Pain Management Interventions: emotional support  Taken 4/15/2025 1714 by Franchesca Salinas RN  Pain Management Interventions:   MD notified (comment)   medication (see MAR)  Taken 4/15/2025 1615 by Franchesca Salinas RN  Pain Management Interventions: MD notified (comment)       Goal Outcome Evaluation:    Patient ambulated twice on days; Sat in chair twice and tolerated. IVF infusing and zosyn continued.   CBI running fast this am. Color was light pink. Rate decreased to moderate then slow rate. Urine was a darker pink but no clots.   Seen by radiology. CBI turned off. Urine remained watermelon in color with sediment.   Early evening pt stated he was having left shin/calf pain updated resident. Given tylenol. Later swelling increased in left foot/ ankle. Resident updated again, then report given to oncoming RN to followup.

## 2025-04-16 NOTE — DISCHARGE SUMMARY
"[facility]  Discharge Summary - Medicine & Pediatrics       Date of Admission:  4/14/2025  Date of Discharge:  4/16/2025  Discharging Provider: Kate Lyle DO  Discharge Service: Hospitalist Service    Discharge Diagnoses   UTI   Prostatitis with abscess  S/p transurethral resection of prostate abscess   Left foot pain and edema     Clinically Significant Risk Factors     # Obesity: Estimated body mass index is 33.79 kg/m  as calculated from the following:    Height as of this encounter: 1.753 m (5' 9\").    Weight as of this encounter: 103.8 kg (228 lb 13.4 oz).       Follow-ups Needed After Discharge   Follow up PCP within 7-10 days following discharge   Follow up with Urology within 1 week for puentes removal   Unresulted Labs Ordered in the Past 30 Days of this Admission       Date and Time Order Name Status Description    4/14/2025  9:12 PM Surgical Pathology Exam In process     4/14/2025  9:00 AM Urine Culture Preliminary     4/14/2025  8:20 AM Blood Culture Peripheral Blood Preliminary     4/14/2025  8:20 AM Blood Culture Peripheral Blood Preliminary         These results will be followed up by urology.     Discharge Disposition   Discharged to home  Condition at discharge: Stable    Hospital Course   Scot Spaulding is a 64 year old male admitted on 4/14/2025. He has a history of T2DM on insulin and pyelonephritis resistant to outpatient treatment and is admitted for UTI complicated by potential prostatic abscess and prostatitis. Now s/p I&D with urology and of Zosyn.      UTI   Prostatitis with abscess  Patient coming in with what sounds like 7 weeks of a smoldering UTI like infection that failed outpatient therapy. His white blood cell showed a neutrophilic predominant leukocytosis to 16.  Urinalysis suggestive of infection and is nitrate positive.  CT was obtained which showed signs of cystitis as well as a potential prostatic abscess measuring 4 x 3 x 2.4 cm.  Patient admitted " for IV antibiotics and management of prostatic abscess and cystitis per urology. Received IV antibiotics throughout hospital stay. Now status post I&D of prostatic abscess. Patient clinically improved since admission. Catheter draining light pink urine with clots/mucus. Urine culture growing E.Coli and Enterococcus fecalis. Blood cultures negative to date.   - Continue puentes at discharge for 1 week  - Repeat outpatient CT per urology   - Discussed antibiotic selection and duration with ID, recommending:   -  21 days of Augmentin BID   - Tylenol as needed     T2DM  Patient has a history of type 2 diabetes.  He takes metformin thousand twice daily and Lantus 60 units in the morning.  He took his Lantus this morning.  His glucose on admission was 312. No anion gap and no signs of DKA during this admission.   Most recent A1c 11.7 1 month ago. Patient likely requires improved outpatient insulin regimen as persistent hyperglycemia puts him at risk of infections as above.   - Continue metformin BID   - Decrease Lantus to 45 unit(s) qam   - Start mealtime insulin 15 unit(s) TID with meals   - Close follow up with PCP for diabetes management     Left foot pain/edema  Patient complaining of mild edema in the left foot distal to the ankle with minimal tenderness to palpation. Notably, patient has a history of left knee/LLE pain and hyperalgesia that has been worked up by sports medicine, PCP, and rheumatology. Thought to be due to osteoarthritis. No acute concern for infection or trauma. Uric acid checked despite no history of gout and was low. US of the LLE negative for DVT.   - Follow up with PCP     Consultations This Hospital Stay   UROLOGY IP CONSULT    Code Status   Full Code     The patient was discussed with Dr. Erma Lyle DO  [facility]  [address] [address]  Phone: [phone]  Fax:  [phone]  ______________________________________________________________________    Physical Exam   Vital Signs: Temp: 97.7  F (36.5  C) Temp src: Oral BP: 134/70 Pulse: 76   Resp: 18 SpO2: 95 % O2 Device: None (Room air)    Weight: 228 lbs 13.4 oz  Physical Exam  Constitutional:       General: He is not in acute distress.     Appearance: Normal appearance. He is not ill-appearing.   HENT:      Mouth/Throat:      Mouth: Mucous membranes are moist.      Pharynx: Oropharynx is clear.   Cardiovascular:      Rate and Rhythm: Normal rate and regular rhythm.      Pulses: Normal pulses.      Heart sounds: Normal heart sounds.   Pulmonary:      Effort: Pulmonary effort is normal.      Breath sounds: Normal breath sounds.   Abdominal:      General: Abdomen is flat. Bowel sounds are normal.      Palpations: Abdomen is soft.   Musculoskeletal:      Comments: Minimal edema of the left foot compared to the right. Non-pitting and only distal to the ankle. Mild tenderness to palpation of the left medial malleolus and midfoot. No bony abnormalities or skin changes. Pulses intact. Appears well perfused.    Skin:     General: Skin is warm and dry.   Neurological:      General: No focal deficit present.      Mental Status: He is alert.   Psychiatric:         Mood and Affect: Mood normal.          Primary Care Physician   Physician No Ref-Primary    Discharge Orders      Reason for your hospital stay    Prostate abscess and urinary tract infection     Activity    Your activity upon discharge: activity as tolerated     Tubes and Drains    Current Tubes and Drains:     Drain  Duration           Urinary Drain 04/14/25 Urethral Catheter Latex;Triple-lumen 22 fr 1 day         To straight gravity drainage. Change catheter every 2 weeks and PRN for leaking or decreased urine output with signs of bladder distention. DO NOT change catheter without a specific Provider order IF diagnosis of benign prostatic hypertrophy (BPH), neurogenic  bladder, or other urological conditions      Follow Up    Follow up with urology, within 1 week. for hospital follow- up. The following labs/tests are recommended: CT after discharge.     Diet    Follow this diet upon discharge: Current Diet:Orders Placed This Encounter      Low Consistent Carb (45 g CHO per Meal) Diet       Significant Results and Procedures   Most Recent 3 CBC's:  Recent Labs   Lab Test 04/16/25  0615 04/15/25  0709 04/14/25  0823   WBC 10.0 13.8* 16.5*   HGB 11.6* 12.4* 14.8   MCV 91 91 88    222 260     7-Day Micro Results       Collected Updated Procedure Result Status      04/14/2025 0841 04/16/2025 1146 Blood Culture Peripheral Blood [[id number]]   Peripheral Blood    Preliminary result Component Value   Culture No growth after 2 days  [P]                04/14/2025 0827 04/16/2025 0516 Urine Culture [[id number]]    (Abnormal)   Urine, NOS    Preliminary result Component Value   Culture >100,000 CFU/mL Escherichia coli  [P]     10,000-50,000 CFU/mL Enterococcus faecalis  [P]         Susceptibility        Escherichia coli      JAVY      Ampicillin <=2 ug/mL Susceptible      Ampicillin/ Sulbactam <=2 ug/mL Susceptible      Cefazolin <=1 ug/mL Susceptible      Cefepime <=0.12 ug/mL Susceptible      Ceftazidime <=0.5 ug/mL Susceptible      Ceftriaxone <=0.25 ug/mL Susceptible      Ciprofloxacin 0.5 ug/mL Intermediate      Gentamicin <=1 ug/mL Susceptible      Levofloxacin 1 ug/mL Intermediate      Nitrofurantoin <=16 ug/mL Susceptible      Piperacillin/Tazobactam <=4 ug/mL Susceptible      Trimethoprim/Sulfamethoxazole <=1/19 ug/mL Susceptible                           04/14/2025 0823 04/16/2025 1146 Blood Culture Peripheral Blood [[id number]]   Peripheral Blood    Preliminary result Component Value   Culture No growth after 2 days  [P]                      Most Recent 6 glucoses:  Recent Labs   Lab Test 04/16/25  1225 04/16/25  0836 04/16/25  0615 04/16/25  0457 04/16/25  0121  04/15/25  2121   * 121* 133* 149* 165* 252*     Most Recent Urinalysis:  Recent Labs   Lab Test 04/14/25  0827   COLOR Blanca*   APPEARANCE Cloudy*   URINEGLC 300*   URINEBILI Negative   URINEKETONE Negative   SG 1.024   UBLD 0.2 mg/dL*   URINEPH 5.5   PROTEIN 300*   NITRITE Positive*   LEUKEST 500 Leobardo/uL*   RBCU 0   WBCU >182*   ,   Results for orders placed or performed during the hospital encounter of 04/14/25   CT Abdomen Pelvis w Contrast    Narrative    EXAM: CT ABDOMEN PELVIS W CONTRAST  LOCATION: [facility]  DATE: 4/14/2025    INDICATION: SIRS, flank pain, abdominal pain, history of pyelonephritis  COMPARISON: CT AP 8/24/2022  TECHNIQUE: CT scan of the abdomen and pelvis was performed following injection of IV contrast. Multiplanar reformats were obtained. Dose reduction techniques were used.  CONTRAST: IsoVue 370 90mL    FINDINGS:   LOWER CHEST: Calcified granuloma in the left upper lobe. Slightly elevated right hemidiaphragm, unchanged.    HEPATOBILIARY: Diffuse fatty infiltration of the liver.    PANCREAS: Fatty infiltration the gland.    SPLEEN: Normal with incidental splenule.    ADRENAL GLANDS: Normal.    KIDNEYS/BLADDER: Bladder is only mildly distended but the patient has developed circumferential wall thickening and mild enhancement of the bladder mucosa. In addition there are multiple right sided perivesicular nodes, largest measuring 8 mm along the   dome (axial images 227-231, coronal images 44 through 45). Kidneys are unremarkable. No hydronephrosis, calculi or perinephric stranding.    BOWEL: No inflammatory changes. Redundant sigmoid. No diverticuli. Bowel is of normal caliber.    LYMPH NODES: Few less than 1 cm retroperitoneal and bilateral external iliac nodes are present. No concerning adenopathy.    VASCULATURE: Mild arterial calcifications. No aneurysm.    PELVIC ORGANS: Prostate is abnormal. It is enlarged and there are a few, poorly defined hypodense  hypodense lesions within it, more extensive in the right lobe than left. This has an aggregate size of the 4 x 3 x 2.4 cm on the right. Seminal vesicles are   normal.    MUSCULOSKELETAL: No concerning bone or soft tissue lesions.      Impression    IMPRESSION:   1.  Abnormal appearing prostate, right lobe more than the left with appearance indicative of prostatitis with abscess. Digital examination should confirm findings.  2.  In addition, changes  indicative of a cystitis with bladder mucosal enhancement, mild circumferential wall thickening due to prostatic enlargement and adjacent perivesicular adenopathy.  3.  Urology consultation and follow-up needed.  4.  Hepatic steatosis.   US Lower Extremity Venous Duplex Bilateral    Narrative    EXAM: US LOWER EXTREMITY VENOUS DUPLEX BILATERAL  LOCATION: [facility]  DATE: 4/16/2025    INDICATION: left leg ankle pain and swelling  COMPARISON: None.  TECHNIQUE: Venous Duplex ultrasound of bilateral lower extremities with and without compression, augmentation and duplex. Color flow and spectral Doppler with waveform analysis performed.    FINDINGS: Exam includes the common femoral, femoral, popliteal veins as well as segmentally visualized deep calf veins and greater saphenous vein.     RIGHT: No deep vein thrombosis. No superficial thrombophlebitis. No popliteal cyst.    LEFT: No deep vein thrombosis. No superficial thrombophlebitis. No popliteal cyst.      Impression    IMPRESSION:  1.  No deep venous thrombosis in the bilateral lower extremities.       Discharge Medications   Current Discharge Medication List        START taking these medications    Details   amoxicillin-clavulanate (AUGMENTIN) 875-125 MG tablet Take 1 tablet by mouth 2 times daily.  Qty: 42 tablet, Refills: 0    Associated Diagnoses: Prostate abscess      !! insulin aspart (NOVOLOG PEN) 100 UNIT/ML pen Inject 15 Units subcutaneously daily (with breakfast).  Qty: 15 mL, Refills:  0    Associated Diagnoses: Type 2 diabetes mellitus with microalbuminuria, with long-term current use of insulin (H)      !! insulin aspart (NOVOLOG PEN) 100 UNIT/ML pen Inject 15 Units subcutaneously daily (with lunch).  Qty: 15 mL, Refills: 0    Associated Diagnoses: Type 2 diabetes mellitus with microalbuminuria, with long-term current use of insulin (H)      !! insulin aspart (NOVOLOG PEN) 100 UNIT/ML pen Inject 15 Units subcutaneously daily (with dinner).  Qty: 15 mL, Refills: 0    Associated Diagnoses: Type 2 diabetes mellitus with microalbuminuria, with long-term current use of insulin (H)       !! - Potential duplicate medications found. Please discuss with provider.        CONTINUE these medications which have CHANGED    Details   LANTUS SOLOSTAR 100 UNIT/ML soln Inject 45 Units subcutaneously every morning.  Qty: 15 mL, Refills: 0    Comments: If Lantus is not covered by insurance, may substitute Basaglar or Semglee or other insulin glargine product per insurance preference at same dose and frequency.    Associated Diagnoses: Type 2 diabetes mellitus with microalbuminuria, with long-term current use of insulin (H)           CONTINUE these medications which have NOT CHANGED    Details   cyclobenzaprine (FLEXERIL) 5 MG tablet Take 5-10 mg by mouth 3 times daily as needed for muscle spasms.      irbesartan (AVAPRO) 150 MG tablet Take 150 mg by mouth daily.      metFORMIN (GLUCOPHAGE XR) 500 MG 24 hr tablet Take 2,000 mg by mouth daily (with breakfast).      nabumetone (RELAFEN) 750 MG tablet Take 750 mg by mouth 2 times daily as needed for pain.      tiZANidine (ZANAFLEX) 4 MG tablet Take 4 mg by mouth nightly as needed for muscle spasms.      Continuous Blood Gluc Sensor (FREESTYLE HÉCTOR 14 DAY SENSOR) MISC       glucose (BD GLUCOSE) 4 g chewable tablet Chew 3-4 tablets only as needed in case of low blood sugar (<70 mg/dL)      rosuvastatin (CRESTOR) 5 MG tablet Take 5 mg by mouth daily.           Allergies    No Known Allergies

## 2025-04-16 NOTE — PROGRESS NOTES
"  [facility] - POSTOP PROGRESS NOTE    PLACE OF SERVICE:  [facility]     SURGERY: POD #2 after cystoscopy, transurethral resection of prostate abscesses by  Dr. Gamino  for prostatic abscesses     SUBJECTIVE:   Events: no acute events overnight. CBI off since 4/15, urine translucent light watermelon without clots. Denies abdominal and bilateral flank pain, scrotal/penile pain, fever, chills.     Receiving prophylactic lovenox.     OBJECTIVE:  PHYSICAL EXAM  Vital signs:  Temp: 97.7  F (36.5  C) Temp src: Oral BP: 134/70 Pulse: 76   Resp: 18 SpO2: 95 % O2 Device: None (Room air) Oxygen Delivery: 2 LPM Height: 175.3 cm (5' 9\") Weight: 103.8 kg (228 lb 13.4 oz)  Estimated body mass index is 33.79 kg/m  as calculated from the following:    Height as of this encounter: 1.753 m (5' 9\").    Weight as of this encounter: 103.8 kg (228 lb 13.4 oz).    General: NAD, alert, cooperative  Abdomen: Soft, non-tender, non-distended. No SP fullness or tenderness.   : Penis and scrotum without erythema, edema, tenderness, crepitus, necrotic lesions. Chang draining translucent light watermelon colored urine. Manually irrigated with aspiration of a couple of small tissue particles, no clots, good return of light pink to yellow urine.     LABS:  No results found for: \"INR\"   Lab Results   Component Value Date    WBC 10.0 04/16/2025     Lab Results   Component Value Date    RBC 3.75 04/16/2025     Lab Results   Component Value Date    HGB 11.6 04/16/2025     Lab Results   Component Value Date    HCT 34.2 04/16/2025     No components found for: \"MCT\"  Lab Results   Component Value Date    MCV 91 04/16/2025     Lab Results   Component Value Date    MCH 30.9 04/16/2025     Lab Results   Component Value Date    MCHC 33.9 04/16/2025     Lab Results   Component Value Date    RDW 13.2 04/16/2025     Lab Results   Component Value Date     04/16/2025     Creatinine   Date Value Ref Range Status   04/16/2025 1.05 0.67 - 1.17 " "mg/dL Final     Sodium   Date Value Ref Range Status   04/16/2025 141 135 - 145 mmol/L Final     Potassium   Date Value Ref Range Status   04/16/2025 4.2 3.4 - 5.3 mmol/L Final   08/24/2022 4.4 3.5 - 5.0 mmol/L Final     No results found for: \"MAG\"    UA RESULTS:  Recent Labs   Lab Test 04/14/25  0827   COLOR Blanca*   APPEARANCE Cloudy*   URINEGLC 300*   URINEBILI Negative   URINEKETONE Negative   SG 1.024   UBLD 0.2 mg/dL*   URINEPH 5.5   PROTEIN 300*   NITRITE Positive*   LEUKEST 500 Leobardo/uL*   RBCU 0   WBCU >182*     Urine culture 4/14: >100k E.coli, 10-50k Enterococcus faecalis  Susceptibility     Escherichia coli     JAVY     Ampicillin <=2 ug/mL Susceptible     Ampicillin/ Sulbactam <=2 ug/mL Susceptible     Cefazolin <=1 ug/mL Susceptible     Cefepime <=0.12 ug/mL Susceptible     Ceftazidime <=0.5 ug/mL Susceptible     Ceftriaxone <=0.25 ug/mL Susceptible     Ciprofloxacin 0.5 ug/mL Intermediate     Gentamicin <=1 ug/mL Susceptible     Levofloxacin 1 ug/mL Intermediate     Nitrofurantoin <=16 ug/mL Susceptible     Piperacillin/Tazobactam <=4 ug/mL Susceptible     Trimethoprim/Sulfamethoxazole <=1/19 ug/mL Susceptible      BC 4/14: NGTD    Lab Results: personally reviewed.     ASSESSMENT/PLAN:  POD #2 after cystoscopy, transurethral resection of prostate abscesses by Dr. Gamino for prostatic abscesses    - Urine appears to be clearing, CBI off since 4/15 AM. Anticipate complete resolution of hematuria once off enoxaparin. Hgb 11.6 today, 12.4 on 4/15.   -  UC: E.coli and Enterococcus faecalis. BC: NGTD. WBC normalized today. Remains afebrile. Per Dr. Lyle, antibiotics discussed with ID and plan is for BID augmentin x 21 days.   - Creatinine/GFR WNL.   - Ok from Urology standpoint for discharge. Maintain puentes at discharge. [facility] arranging TOV, CT Abd/Pelvis and clinic follow up with Dr. Gamino in 1 week.   - Surgical pathology pending.     Discussed with: Dr. Stevenson Canales, APRN, CNP  [facility]   [phone]

## 2025-04-17 LAB
BACTERIA BLD CULT: NORMAL
BACTERIA BLD CULT: NORMAL
PATH REPORT.COMMENTS IMP SPEC: NORMAL
PATH REPORT.COMMENTS IMP SPEC: NORMAL
PATH REPORT.FINAL DX SPEC: NORMAL
PATH REPORT.GROSS SPEC: NORMAL
PATH REPORT.MICROSCOPIC SPEC OTHER STN: NORMAL
PATH REPORT.RELEVANT HX SPEC: NORMAL
PHOTO IMAGE: NORMAL

## 2025-04-19 LAB
BACTERIA BLD CULT: NO GROWTH
BACTERIA BLD CULT: NO GROWTH

## 2025-06-29 ENCOUNTER — HEALTH MAINTENANCE LETTER (OUTPATIENT)
Age: 65
End: 2025-06-29

## (undated) DEVICE — SYR 50ML CATH TIP W/O NDL 309620

## (undated) DEVICE — SOL WATER IRRIG 1000ML BOTTLE 2F7114

## (undated) DEVICE — SYR 30ML LL W/O NDL 302832

## (undated) DEVICE — TUBING SET THERMEDX UROLOGY SGL USE LL0006

## (undated) DEVICE — PREP DYNA-HEX 4% CHG SCRUB 4OZ BOTTLE MDS098710

## (undated) DEVICE — SOL NACL 0.9% IRRIG 3000ML BAG 2B7127

## (undated) DEVICE — EVACUATOR BLADDER UROVAC LATEX M0067301250

## (undated) DEVICE — BAG URINARY DRAIN 4000ML LF 153509

## (undated) DEVICE — STRAP POSITIONING DEVON VELCRO 60X4 IN KNEE LF 31143005

## (undated) DEVICE — CATH FOLEY 3WAY 22FR 30ML LUBRICATH LATEX 0167L22

## (undated) DEVICE — MAT FLOOR WATERPROOF BACKSHEET FMBP30

## (undated) DEVICE — STRAP CATH LEG ADJUSTABLE 0814-8200

## (undated) DEVICE — A3 SUPPLIES- SEE NURSING INFO PAGE

## (undated) DEVICE — SUCTION MANIFOLD NEPTUNE 2 SYS 1 PORT 702-025-000

## (undated) DEVICE — SYR PISTON URETHRAL 60ML 68000

## (undated) DEVICE — GLOVE BIOGEL PI SZ 7.5 40875

## (undated) DEVICE — CUSTOM PACK CYSTO PREFERRED SOT5BCYHEA

## (undated) DEVICE — ESU ELEC LOOP HF-RESECTION 24FR MED 12/16 WA22602S

## (undated) DEVICE — TUBING SUCTION MEDI-VAC 1/4"X20' N620A

## (undated) DEVICE — ESU GROUND PAD ADULT REM W/15' CORD E7507DB

## (undated) DEVICE — CONTAINER URINE SPEC 4OZ STRL 1053

## (undated) RX ORDER — FENTANYL CITRATE 50 UG/ML
INJECTION, SOLUTION INTRAMUSCULAR; INTRAVENOUS
Status: DISPENSED
Start: 2025-04-14